# Patient Record
Sex: FEMALE | Race: WHITE | NOT HISPANIC OR LATINO | Employment: UNEMPLOYED | URBAN - METROPOLITAN AREA
[De-identification: names, ages, dates, MRNs, and addresses within clinical notes are randomized per-mention and may not be internally consistent; named-entity substitution may affect disease eponyms.]

---

## 2017-05-30 ASSESSMENT — ENCOUNTER SYMPTOMS
SMELL DISTURBANCE: 0
NERVOUS/ANXIOUS: 1
PANIC: 1
NIGHT SWEATS: 0
TACHYCARDIA: 1
JOINT SWELLING: 0
CLAUDICATION: 0
POOR WOUND HEALING: 0
LIGHT-HEADEDNESS: 1
FEVER: 0
SKIN CHANGES: 0
MYALGIAS: 1
SINUS CONGESTION: 0
BLOOD IN STOOL: 0
BRUISES/BLEEDS EASILY: 0
DEPRESSION: 0
HYPERTENSION: 0
BOWEL INCONTINENCE: 0
VOMITING: 0
LOSS OF CONSCIOUSNESS: 0
TINGLING: 0
DIFFICULTY URINATING: 0
TROUBLE SWALLOWING: 1
LEG SWELLING: 0
DECREASED LIBIDO: 0
INSOMNIA: 1
JAUNDICE: 0
SWOLLEN GLANDS: 0
DISTURBANCES IN COORDINATION: 0
BLOATING: 1
TREMORS: 0
POLYDIPSIA: 0
FATIGUE: 1
NAUSEA: 1
CHILLS: 0
NUMBNESS: 1
ALTERED TEMPERATURE REGULATION: 1
WEIGHT GAIN: 0
DECREASED CONCENTRATION: 1
INCREASED ENERGY: 1
BACK PAIN: 1
CONSTIPATION: 1
SPEECH CHANGE: 0
EXERCISE INTOLERANCE: 0
WEAKNESS: 0
MEMORY LOSS: 1
HEARTBURN: 0
HEMATURIA: 0
PARALYSIS: 0
MUSCLE WEAKNESS: 0
TASTE DISTURBANCE: 0
HEADACHES: 0
LEG PAIN: 0
SEIZURES: 0
DECREASED APPETITE: 0
RECTAL BLEEDING: 1
FLANK PAIN: 0
STIFFNESS: 1
NECK PAIN: 1
POLYPHAGIA: 0
ABDOMINAL PAIN: 0
PALPITATIONS: 0
HOARSE VOICE: 0
WEIGHT LOSS: 0
SLEEP DISTURBANCES DUE TO BREATHING: 0
NAIL CHANGES: 0
DIARRHEA: 0
RECTAL PAIN: 0
SORE THROAT: 0
DYSURIA: 0
SINUS PAIN: 0
HOT FLASHES: 0
HYPOTENSION: 1
ARTHRALGIAS: 1
DIZZINESS: 0
NECK MASS: 0
ORTHOPNEA: 0
HALLUCINATIONS: 0
MUSCLE CRAMPS: 1
SYNCOPE: 0

## 2017-05-31 ENCOUNTER — CARE COORDINATION (OUTPATIENT)
Dept: ONCOLOGY | Facility: CLINIC | Age: 47
End: 2017-05-31

## 2017-05-31 ENCOUNTER — ONCOLOGY VISIT (OUTPATIENT)
Dept: ONCOLOGY | Facility: CLINIC | Age: 47
End: 2017-05-31
Attending: INTERNAL MEDICINE
Payer: COMMERCIAL

## 2017-05-31 VITALS
DIASTOLIC BLOOD PRESSURE: 67 MMHG | RESPIRATION RATE: 16 BRPM | HEART RATE: 87 BPM | WEIGHT: 113.8 LBS | HEIGHT: 65 IN | BODY MASS INDEX: 18.96 KG/M2 | OXYGEN SATURATION: 96 % | TEMPERATURE: 98.5 F | SYSTOLIC BLOOD PRESSURE: 115 MMHG

## 2017-05-31 DIAGNOSIS — R04.0 EPISTAXIS: ICD-10-CM

## 2017-05-31 DIAGNOSIS — R53.82 CHRONIC FATIGUE: Primary | ICD-10-CM

## 2017-05-31 DIAGNOSIS — M79.7 FIBROMYALGIA: ICD-10-CM

## 2017-05-31 DIAGNOSIS — R53.82 CHRONIC FATIGUE: ICD-10-CM

## 2017-05-31 LAB
ALBUMIN SERPL-MCNC: 4.6 G/DL (ref 3.4–5)
ALP SERPL-CCNC: 77 U/L (ref 40–150)
ALT SERPL W P-5'-P-CCNC: 22 U/L (ref 0–50)
ANION GAP SERPL CALCULATED.3IONS-SCNC: 9 MMOL/L (ref 3–14)
APTT PPP: 26 SEC (ref 22–37)
AST SERPL W P-5'-P-CCNC: 17 U/L (ref 0–45)
BASOPHILS # BLD AUTO: 0.1 10E9/L (ref 0–0.2)
BASOPHILS NFR BLD AUTO: 0.9 %
BILIRUB SERPL-MCNC: 0.4 MG/DL (ref 0.2–1.3)
BUN SERPL-MCNC: 14 MG/DL (ref 7–30)
CALCIUM SERPL-MCNC: 9.2 MG/DL (ref 8.5–10.1)
CHLORIDE SERPL-SCNC: 106 MMOL/L (ref 94–109)
CO2 SERPL-SCNC: 25 MMOL/L (ref 20–32)
CREAT SERPL-MCNC: 0.59 MG/DL (ref 0.52–1.04)
DIFFERENTIAL METHOD BLD: ABNORMAL
EOSINOPHIL # BLD AUTO: 0.1 10E9/L (ref 0–0.7)
EOSINOPHIL NFR BLD AUTO: 0.7 %
ERYTHROCYTE [DISTWIDTH] IN BLOOD BY AUTOMATED COUNT: 12.1 % (ref 10–15)
GFR SERPL CREATININE-BSD FRML MDRD: NORMAL ML/MIN/1.7M2
GLUCOSE SERPL-MCNC: 83 MG/DL (ref 70–99)
HCT VFR BLD AUTO: 42 % (ref 35–47)
HGB BLD-MCNC: 13.2 G/DL (ref 11.7–15.7)
IMM GRANULOCYTES # BLD: 0 10E9/L (ref 0–0.4)
IMM GRANULOCYTES NFR BLD: 0.2 %
INR PPP: 1.03 (ref 0.86–1.14)
LMWH PPP CHRO-ACNC: NORMAL IU/ML
LYMPHOCYTES # BLD AUTO: 2.4 10E9/L (ref 0.8–5.3)
LYMPHOCYTES NFR BLD AUTO: 27.1 %
MAGNESIUM SERPL-MCNC: 2.4 MG/DL (ref 1.6–2.3)
MCH RBC QN AUTO: 33.1 PG (ref 26.5–33)
MCHC RBC AUTO-ENTMCNC: 31.4 G/DL (ref 31.5–36.5)
MCV RBC AUTO: 105 FL (ref 78–100)
MONOCYTES # BLD AUTO: 0.5 10E9/L (ref 0–1.3)
MONOCYTES NFR BLD AUTO: 5.9 %
NEUTROPHILS # BLD AUTO: 5.7 10E9/L (ref 1.6–8.3)
NEUTROPHILS NFR BLD AUTO: 65.2 %
NRBC # BLD AUTO: 0 10*3/UL
NRBC BLD AUTO-RTO: 0 /100
PLATELET # BLD AUTO: 365 10E9/L (ref 150–450)
POTASSIUM SERPL-SCNC: 3.7 MMOL/L (ref 3.4–5.3)
PROT SERPL-MCNC: 7.9 G/DL (ref 6.8–8.8)
RBC # BLD AUTO: 3.99 10E12/L (ref 3.8–5.2)
SODIUM SERPL-SCNC: 140 MMOL/L (ref 133–144)
WBC # BLD AUTO: 8.7 10E9/L (ref 4–11)

## 2017-05-31 PROCEDURE — 82542 COL CHROMOTOGRAPHY QUAL/QUAN: CPT | Mod: 91 | Performed by: INTERNAL MEDICINE

## 2017-05-31 PROCEDURE — 85610 PROTHROMBIN TIME: CPT | Performed by: INTERNAL MEDICINE

## 2017-05-31 PROCEDURE — 82542 COL CHROMOTOGRAPHY QUAL/QUAN: CPT | Performed by: INTERNAL MEDICINE

## 2017-05-31 PROCEDURE — 85520 HEPARIN ASSAY: CPT | Performed by: INTERNAL MEDICINE

## 2017-05-31 PROCEDURE — 83088 ASSAY OF HISTAMINE: CPT | Performed by: INTERNAL MEDICINE

## 2017-05-31 PROCEDURE — 88184 FLOWCYTOMETRY/ TC 1 MARKER: CPT | Performed by: INTERNAL MEDICINE

## 2017-05-31 PROCEDURE — 85730 THROMBOPLASTIN TIME PARTIAL: CPT | Performed by: INTERNAL MEDICINE

## 2017-05-31 PROCEDURE — 86316 IMMUNOASSAY TUMOR OTHER: CPT | Performed by: INTERNAL MEDICINE

## 2017-05-31 PROCEDURE — 83520 IMMUNOASSAY QUANT NOS NONAB: CPT | Performed by: INTERNAL MEDICINE

## 2017-05-31 PROCEDURE — 99354 ZZC PROLONGED SERV,OFFICE,1ST HR: CPT | Mod: ZP | Performed by: INTERNAL MEDICINE

## 2017-05-31 PROCEDURE — 84150 ASSAY OF PROSTAGLANDIN: CPT | Performed by: INTERNAL MEDICINE

## 2017-05-31 PROCEDURE — 88185 FLOWCYTOMETRY/TC ADD-ON: CPT | Performed by: INTERNAL MEDICINE

## 2017-05-31 PROCEDURE — 99212 OFFICE O/P EST SF 10 MIN: CPT | Mod: ZF

## 2017-05-31 PROCEDURE — 99205 OFFICE O/P NEW HI 60 MIN: CPT | Mod: ZP | Performed by: INTERNAL MEDICINE

## 2017-05-31 PROCEDURE — 83516 IMMUNOASSAY NONANTIBODY: CPT | Performed by: INTERNAL MEDICINE

## 2017-05-31 PROCEDURE — 80053 COMPREHEN METABOLIC PANEL: CPT | Performed by: INTERNAL MEDICINE

## 2017-05-31 PROCEDURE — 83735 ASSAY OF MAGNESIUM: CPT | Performed by: INTERNAL MEDICINE

## 2017-05-31 PROCEDURE — 85025 COMPLETE CBC W/AUTO DIFF WBC: CPT | Performed by: INTERNAL MEDICINE

## 2017-05-31 ASSESSMENT — PAIN SCALES - GENERAL: PAINLEVEL: NO PAIN (0)

## 2017-05-31 NOTE — MR AVS SNAPSHOT
After Visit Summary   5/31/2017    Kenton Mathew    MRN: [id number]           Patient Information     Date Of Birth          1970        Visit Information        Provider Department      5/31/2017 2:30 PM Keegan Willson MD [facility]        Today's Diagnoses     Chronic fatigue    -  1    Fibromyalgia        Epistaxis           Follow-ups after your visit        Follow-up notes from your care team     Return in about 3 months (around 8/31/2017).      Your next 10 appointments already scheduled     Jun 02, 2017  8:15 AM CDT   Lab with  LAB   [facility])    [address] [place] [address]  [address] [facility] [id number] [phone]            Oct 13, 2017  8:40 AM CDT   (Arrive by 8:25 AM)   Return Visit with Rafaela Obrien PA-C   [facility] ([facility])    [address] [place] [address]  [address] [facility] [id number] [phone]            Jan 09, 2018  2:30 PM CST   (Arrive by 2:15 PM)   New Patient Visit with Keegan Willson MD   [facility] ([facility])    [address] [place] [id number] [phone]              Future tests that were ordered for you today     Open Future Orders        Priority Expected Expires Ordered    2,3 Dinor 11 Beta Prostaglandin F2 Alpha UR 24 hour Routine 6/2/2017 5/31/2018 5/31/2017    N-Methylhistamine Urine 24 hour Routine 6/2/2017 5/31/2018 5/31/2017    Prostaglandins D2 Urine: 24 hour Routine 6/2/2017 5/31/2018 5/31/2017    Leukotriene E4 Urine: 24 hour Routine 6/2/2017 5/31/2018 5/31/2017            Who to contact     If you have questions or need follow up information about today's clinic visit or your schedule please contact [facility] directly at [phone].  Normal or non-critical lab and imaging results will be communicated to you by MyChart, letter or  "phone within 4 business days after the clinic has received the results. If you do not hear from us within 7 days, please contact the clinic through Walk-in Appointment Scheduler or phone. If you have a critical or abnormal lab result, we will notify you by phone as soon as possible.  Submit refill requests through Walk-in Appointment Scheduler or call your pharmacy and they will forward the refill request to us. Please allow 3 business days for your refill to be completed.          Additional Information About Your Visit        [employer] Information     Walk-in Appointment Scheduler gives you secure access to your electronic health record. If you see a primary care provider, you can also send messages to your care team and make appointments. If you have questions, please call your primary care clinic.  If you do not have a primary care provider, please call [phone] and they will assist you.        Care EveryWhere ID     This is your Care EveryWhere ID. This could be used by other organizations to access your [place] medical records  [id number]        Your Vitals Were     Pulse Temperature Respirations Height Last Period Pulse Oximetry    87 98.5  F (36.9  C) (Oral) 16 1.651 m (5' 5\") 05/20/2017 96%    BMI (Body Mass Index)                   18.94 kg/m2            Blood Pressure from Last 3 Encounters:   05/31/17 115/67   09/15/15 106/64    Weight from Last 3 Encounters:   05/31/17 51.6 kg (113 lb 12.8 oz)   09/15/15 50.3 kg (111 lb)              We Performed the Following     Anti IgE Antibody     Anti IgE Receptor Antibody     CBC with platelets differential     Chromogranin A     Comprehensive metabolic panel     Histamine     INR     Leukotriene E4 Urine: Random     Magnesium     N-Methylhistamine Urine Random     Partial thromboplastin time     Prostaglandin PGD2     Prostaglandins D2 Urine: Random     Tryptase          Today's Medication Changes          These changes are accurate as of: 5/31/17 11:59 PM.  If you have any questions, ask your nurse or doctor.             "   Stop taking these medicines if you haven't already. Please contact your care team if you have questions.     ASPIRIN PO   Stopped by:  Keegan Willson MD                    Primary Care Provider    None Specified       No primary provider on file.        Thank you!     Thank you for choosing [facility]  for your care. Our goal is always to provide you with excellent care. Hearing back from our patients is one way we can continue to improve our services. Please take a few minutes to complete the written survey that you may receive in the mail after your visit with us. Thank you!             Your Updated Medication List - Protect others around you: Learn how to safely use, store and throw away your medicines at www.disposemymeds.org.          This list is accurate as of: 5/31/17 11:59 PM.  Always use your most recent med list.                   Brand Name Dispense Instructions for use    MULTIVITAMIN/IRON PO      Take 1 tablet by mouth daily       TRAZODONE HCL PO      Take 100 mg by mouth daily       VITAMIN D (CHOLECALCIFEROL) PO      Take 4,000 Units by mouth daily

## 2017-05-31 NOTE — LETTER
2017       RE: Kenton Mathew  [address]  [facility] [id number]     Dear Colleague,    Thank you for referring your patient, Kenton Mathew, to the [facility]. Please see a copy of my visit note below.    Patient: Kenton Mathew (MRN [id number])   Encounter Date: May 31, 2017  Referring: Jessica Lyons M.D. ([facility],  [address], [place] [address], [phone], fax [phone])  Attending: Keegan Willson M.D.     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  At the beginning of the encounter, I reviewed all available chart data, consisting solely of about 50 pages of an outside genomic analysis whose validity immediately seemed questionable (details below), plus a modicum of lab test reports from [facility] accessed via her phone, plus an extensive assortment of labs accessed via [facility], but there really were no prior useful clinical notes for me to review, so I just proceeded to take a full history from scratch.  Note the patient was recommended by Dr. Bernarda Zenker of [facility] in [place], [place] to come here for evaluation, but the patient asked that I send a copy of my note only to her primary provider, Dr. Lyons, and to the patient herself.  The history here is a bit complex, and it's probably best to just present it all chronologically, blending HPI and PMH as follows.  The patient's history of chronic multisystem polymorbidity of generally inflammatory theme begins with onset of chronic constipation in college or possibly even earlier; somewhat oddly, she says she can't remember anything that happened to her before  "that point.  She says that otherwise she was perfectly healthy while growing up and got  at 24, but then the marriage started souring and she started suffering a lot of stress from this in her late 20s.  At 31 she suffered her first significant health problem, a vertebral artery dissection causing TIAs, visual blurriness, anesthesia in the right hand, and mild dysphasia, though fortunately she gained complete neurologic recovery.  She finally  at 32, remarried at 33, and then at 34 had her first pregnancy, which went quite smoothly, ending with a scheduled .  Her second pregnancy came at 36, also went smoothly, and also ended with a scheduled .  An episode of abdominal pain at 37, though, led to an appendectomy, and then at 38 she suddenly started suffering chronic fatigue (attributed by her physicians to the demands of ) and a new left proximal dysphagia and left throat \"lump\" which were evaluated by an ENT physician and diagnosed as globus.  A swallow study was normal.  At 40, despite no apparent troubles tolerating any foods, an EGD done to further investigate her dysphagia found \"total atrophy\" (per the patient's recollection; again, I have no records) and celiac disease was diagnosed.  She switched to a gluten-free diet and felt better for two weeks, but then her chronic fatigue relapsed.  She says she tried several other diets (elimination, paleo, low-histamine), but none ever provided any clear benefit.  She then saw a celiac disease specialist at [facility] who confirmed this diagnosis but felt it did not explain her symptoms, so she was referred to Neurology at [facility].  She says an EMG was negative, so she was referred to [facility]'s chronic fatigue syndrome/fibromyalgia center, where at 41 she was diagnosed with fibromyalgia.  She says this is also when there emerged the mild macrocytosis which has persisted, stable and unexplained, ever since.  Hematology evaluation at [facility] " "when she was 44 failed to identify a cause.  No marrow examination was performed.  She says three female cousins who have problems similar to hers were all found to have some sort of an MTHFR mutation, and she was recommended to see Dr. Zenker for this.  She says Dr. Zenker prescribed multiple supplements to treat the patient's MTHFR mutation, but none of them seemed to help her feel any better.  She says Dr. 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  The patient says that while she was waiting for her appointment here, Dr. Zenker asked her to empirically try a full (H1 + H2) histamine receptor blockade.  She cannot remember specifically which drugs she tried, but she is pretty sure it was just one H1 blocker and one H2 blocker, and they did not discernibly help, so they were stopped.  She also knows her ferritin and vitamin D have recently been near the lower ends of those tests' normal ranges.  She also reports a number of other (typically episodic, waxing/waning) problems which have emerged in the last 3-5 years including headaches, right eye pain, right pupil enlargement, plugging of her ears, bags under her eyes, jaw pain, mental fog, diffusely migratory joint stiffness and pain, muscle pain and stiffness, palpitations, insomnia, frequent waking, swelling of the nose, coryza, anxiety, foot cramping, arms falling asleep, and various rashes about the abdomen and extremities, especially \"red dots\" and non-pruritic welts on her abdomen, sometimes also on the inner arms and thighs, plus other, rough itchy rashes on her arms in the last year.    On a full review of systems, although she denies any issues with flushing, " "sweats, visual anomalies, unexplained fluctuations in weight or appetite, easy bleeding or bruising, sinonasal congestion, post-nasal drip, irritation of the nose/mouth/throat, chest pain/discomfort, gastroesophageal reflux, vomiting, diarrhea, abdominal pain, urinary urgency, syncope, onychodystrophy, or joint hypermobility, she endorses a wide range of other, chronic/recurrent, episodic/intermittent and/or waxing/waning issues including subjective (rarely objective) fevers, feeling cold much of the time, fatigue (often to the point of exhaustion), malaise, headaches, diffusely migratory aching/pain, diffusely migratory pruritus, irritation of the eyes, plugging of the ears, occasional tinnitus, episodes of spontaneous epistaxis since about age 44, coryza, proximal dysphagia, dyspnea (on occasion she \"just can't catch a deep breath\") but no wheezing, palpitations, nausea, constipation, (usually post-prandial) abdominal bloating, diffusely migratory weakness, edema diffusely migratory about the eyes, nose, throat, and ears, diffusely migratory tingling/numbness (typically about the upper extremities), diffusely migratory occasional bilateral cervical adenitis (but no adenopathy), her first episode ever of presyncope that she can remember occurring just yesterday in the shower, cognitive dysfunction (particularly memory, concentration, and word-finding), hair loss, receding gums, diffusely migratory rashes (typically patchy macular erythema), hives, insomnia, frequent waking, depression (a number of medications were tried, but none ever appeared to help), and vigorous insect bite reactions.  She notes she is asymptomatic if she eats gluten-containing foods.  She thinks she may have an anal fissure.  Complete ROS o/w neg.    Family history is notable for a father with polymyalgia rheumatica, hyperlipidemia, hypertension, and glaucoma, a mother with celiac disease, a benign thyroid nodule which led to a thyroidectomy " "and thyroid replacement therapy, plus a chronic problem with an itchy rash on her back (just as the patient herself now endures), a brother with asthma, a sister with asthma and \"muscle deterioration,\" another brother with celiac disease and asthma, and two healthy children. The patient denies any history of smoking, significant alcohol use (note she does tolerate alcohol), or illegal substance use.    She lists her current medications as vitamin D 4,000 units qd, a daily multivitamin, and trazodone 100 mg qd.  She lists her current allergies as GI upset to gluten, and yet, again, she tells me she can eat gluten-containing foods without any difficulty..    Exam finds a thin (but not cachectic) woman in no apparent distress, pleasant, cooperative, fully alert and oriented, easily independently ambulatory, presenting by herself.  She has many small moles, mostly about her bilateral upper extremities.  Vitals per chart, all unremarkable.  Key findings are her thin but comfortable general appearance, HEENT benign, no plethora/pallor/diaphoresis/jaundice/bleeding/bruising, perhaps a very subtle residual patchy macular lightly erythematous rash about the bilateral shins (plus a healing rash biopsy site on the right shin), neck reported as just a little bit stiff (chronically) in all axes and directions on range of motion testing, no JVD or thyromegaly or carotid bruits, no palpable adenopathy or tenderness at any of the usual node-bearing sites, clear lungs, regular heart with no adventitious sounds, abdomen benign, no peripheral edema, neuro grossly intact.  On a light scratch test on the upper back, moderately bright dermatographism (erythroderma only, no hives) quickly emerged and was fully sustained when last checked 10 minutes later.     Review of available lab data was notable for normal CMP, TSH, LDH, pyridoxal 5-phosphate, anti-thyroid peroxidase antibodies, DARRYL panel, vitamin E, anti-acetylcholine-receptor " "antibodies, anti-striated muscle antibodies, angiotensin converting enzyme, creatine kinase, cortisol, ESR, INR, CCP, IgA, RF, and CRP.  Folate was high.  There was a slight increase in carotene.  Celiac antibody tests at [facility] were negative.  Serum copper and zinc were occasionally slightly low.  The genomic analysis is riddled with \"red alerts,\" but when I started by looking at the SANDY-related SNPs, I saw that she was marked as having homozygosity for the risk allele for two of these SNPs, and yet when I researched these SNPs further on-line, it was immediately apparent that her genotype for each of these SNPs was actually normal, not high-risk, so both of the \"red alerts\" for possible SANDY deficiency on her report appeared incorrect to me, and I felt at that point that examination of the rest of the report would not be sufficiently productive to spend more time on the matter.    Lab reports on [facility] show a number of curiosities.  With regard to CBCs, in 3/05 and 4/05 she had a borderline anemia and mild macrocytosis (103-105) (but a normal RDW), and then suddenly in 9/05 her CBCDs are completely normal (MCVs 95-96), followed in 5/09 with a normal CBCD with an MCV of 87, then in '10-'11 mild anemia with low-normal MCVs and minimal thrombocytosis, and then since around '15 she's had a mostly normal H/H but an MCV of 102 (and, again, a very normal RDW).  An IgA tissue transglutaminase antibody in '11 was quite high, but multiple celiac serologies since '15 have all been completely negative.  GAEL negative.  Lipids unremarkable.  CMP and magnesium normal.  Whole blood histamine in 12/15 was normal.  Homocysteine normal.  Hematopathology review of the blood smear in '15 confirmed the \"slight macrocytosis\" but could not identify a cause.  The right foreleg rash biopsy in 5/17 was read as \"subacute spongiotic dermatitis with mild superficial perivascular lymphocytic inflammation admixed with a few extravasated red " "blood cells...the findings are relatively mild and not clearly diagnostic, but would be most supportive of some form of eczematous dermatitis or possibly the eczematous variant of pigmented purpuric dermatitis.\"    A/P: Yarelis to Dr. Zenker, because I think she's likely right in her suspicion that a mast cell activation disorder (MCAD) -- and far more likely the far more prevalent (if only recently recognized) type termed mast cell activation syndrome (MCAS) than the rare (but long recognized) type termed mastocytosis -- is at the root of most or all of the patient's chronic multisystem polymorbidity of generally inflammatory theme.  Beyond all the other diseases already ruled out by the great extent of testing she's undergone to date, I don't know of any other human disease that comes anywhere near as close as MCAS does to accounting, directly or indirectly, for the full range and chronicity of all the symptoms and findings here. Literally everything here is consistent with what JYOTIS can and often does do, from the arterial dissection to her specific phrasing of how she occasionally just can't catch a deep breath (*precisely* the phrasing I most commonly hear from MCAS patients reporting dyspnea) plus the specific phrasing of the skin biopsy report, which, though non-specific, precisely matches what I see most often in skin biopsies from MCAS patients.  (Of note, too, I'm not saying that any of her prior diagnoses are wrong (though I'm not entirely confident about the celiac disease diagnosis). It's just that each of them accounts for only one subset or another of all that's gone on in her, while MCAS can account for all of everything that's been going on in her.) All of that said, she needs objective laboratory evidence of improperly behaving mast cells, toward which end I ordered the range of testing I typically check in assessing for MCAS, namely, a CBCD, CMP, magnesium, PT/PTT, chilled serum tryptase and " "chromogranin A, chilled plasma prostaglandin D2 and histamine and heparin, chilled random urinary prostaglandin D2 and N-methylhistamine and leukotriene E4 and 2,3-dinor 11-beta-prostaglandin-F2-alpha, and chilled 24-hour urinary prostaglandin D2 and N-methylhistamine and leukotriene E4 and 2,3-dinor 11-beta-prostaglandin-F2-alpha. I also ordered an anti-IgE IgG and an anti-IgE receptor antibody, which won't be useful diagnostically but may influence certain therapeutic decisions down the road if MCAS is proven. We confirmed she has abstained from confounding use of proton pump inhibitors (PPIs) and non-steroidal anti-inflammatory drugs (NSAIDs) for the prior 5+ days. We provided her careful written and verbal instructions regarding the 24-hour urine collection including the importance of continuous chilling of the specimen throughout collection and transport.  She understands the various reasons why a single round of  testing might yield all negative results, and she understands that if this happens, it of course doesn't even begin to invalidate/refute/negate even a single element of her history (which strongly argues for a mast cell disorder). Her history is what it is, so if we get a negative round of testing, I'll simply recommend repeat testing (which she'll of course need to pursue locally), albeit with specimen collection at that point preferably deferred to a particularly symptomatic day. I also asked her to work with our clinic nurse coordinator, Nighat Mckinney RN, to try to arrange for her old GI tract biopsies and resection specimens to be reassessed (either locally or here) with  staining, as mast cells can't be seen with routine H&E staining and I've often seen \"normal\" or \"mildly chronically inflamed\" GI tract biopsies in MCAS patients \"light up\" on  staining revealing mast cell disease. If 2-3 rounds of blood and urine testing yields all negative results, and if restaining of " "any available old biopsies either can't be done for some reason or yields negative results, the \"backup plan\" will be to pursue a fresh set of bidirectional endoscopies to obtain biopsies throughout the luminal GI tract for pathologic evaluation specifically (using  staining at a minimum) for increased (>20/hpf) numbers of mast cells (as often seen mildly-moderately in MCAS, though not to anywhere near the degree (or patterns) seen in mastocytosis).     Although I cautioned her that she doesn't have a definitive diagnosis of MCAS yet, we did engage in extended discussion today about general aspects of MCAS including its essential nature of chronic multisystem polymorbidity of a generally inflammatory theme, the availability of many therapies shown helpful in various MCAD/MCAS patients, the good likelihood of (eventually) finding therapy that will help her achieve the goal of feeling significantly better than the pre-treatment baseline the majority of the time, and the present frustrating absence of any biomarkers that can help predict which of the many available, potentially helpful therapies is most likely to benefit the individual patient. She understands that if we are able to secure a diagnosis of MCAS, she will be dependent on pursuing -- in patient, persistent, and methodical fashion, trying as hard as possible to make just one change at a time -- trials of the various therapies (which, again, lacking predictive biomarkers, we generally pursue in order of escalating cost). She understands that once all test results are available about a month from now, I will write an addendum to this note (to again be distributed to all of her physicians listed below) providing my interpretation of the results and recommendations for next steps.     I told her that the only therapy I am willing to empirically recommend in a patient with suspected, but not yet proven, MCAS is a full (H1 + H2) histamine receptor blockade " "(e.g., cetirizine 10 mg bid or fexofenadine  mg bid + famotidine 20 mg bid or ranitidine  mg bid). She understands that some patients find that alternative H1 or H2 blockers serve them better, some patients find that tid dosing serves them better than bid dosing, and some patients find that slightly higher dosages (e.g., 20-30 mg rather than 10 mg of cetirizine, or 150 mg rather than 75 mg of ranitidine) serve them better than lower dosages. She will need to \"experiment,\" taking 2-4 weeks with each non-sedating H1 blocker (loratadine, cetirizine, fexofenadine, and levocetirizine, each at standard OTC doses but taken bid) to identify which serves her best, and then do the same with (two or more, each in turn) of the OTC H2 blockers (famotidine, ranitidine, cimetidine, and nizatadine) to identify which H2 blocker serves here best.  I also cautioned her that MCAD/MCAS patients have quite a predilection to adversely react not necessarily to the active ingredients in their medications but rather to the excipients (fillers, binders, dyes, preservatives, etc.) in their medications. As such, if she experiences an adverse reaction very shortly after beginning an ordinarily well tolerated medication (as is certainly the case with the H1 and H2 blockers), excipient reactivity must be suspected and she should promptly work with her pharmacist to review the medication's ingredient list, try to identify the likely offending ingredient, and try one or more alternative formulations of the medication which don't share any of the offending formulation's excipients. She appears to understand.     It will be difficult (given that my schedule is now 100% booked for the next year) for me to see her back once the test results are back in a month or so, let alone to manage her through the \"tactical maneuvers\" of trying various medications to gain better control over the disease.  She understands it will be important for her to " "take time to try the various available non-sedating H1 blockers and H2 blockers.  We will have her return in about three months to see the senior physician's assistant here, Briefili Obrien, who now is kindly assisting me with my follow-up patients. Ms. Obrien will work through the above-noted tactical maneuvers with the patient, and I'll see her roughly every 6-12 months for \"strategic reassessments.\"     As is usually the case with initial consultations for mast cell disease, this was a long encounter, 120 minutes in total, with 70 minutes spent in counseling. The patient indicated that all of her questions at this time had been answered to her satisfaction, and she expressed appreciation for the time I had given her.      Typed, reviewed, and electronically signed by: Keegan Willson M.D.     DT: 06/01/17 01:19 AM    cc: 1. Jessica Lyons M.D. ([facility], [address], [place] [address], [phone], fax [phone])  2. Please also mail a copy to the patient at her home address as listed in the system.    Again, thank you for allowing me to participate in the care of your patient.      Sincerely,    Keegan Willson MD      "

## 2017-05-31 NOTE — LETTER
2017    RE: Kenton Mathew  [address]  [facility] [id number]     Patient: Kenton Mathew (MRN [id number])   Encounter Date: May 31, 2017  Referring: Jessica Lyons M.D. ([facility],  [address], [place] [address], [phone], fax [phone])  Attending: Keegan Willson M.D.     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  At the beginning of the encounter, I reviewed all available chart data, consisting solely of about 50 pages of an outside genomic analysis whose validity immediately seemed questionable (details below), plus a modicum of lab test reports from [facility] accessed via her phone, plus an extensive assortment of labs accessed via [facility], but there really were no prior useful clinical notes for me to review, so I just proceeded to take a full history from scratch.  Note the patient was recommended by Dr. Bernarda Zenker of [facility] in [place], [place] to come here for evaluation, but the patient asked that I send a copy of my note only to her primary provider, Dr. Lyons, and to the patient herself.  The history here is a bit complex, and it's probably best to just present it all chronologically, blending HPI and PMH as follows.  The patient's history of chronic multisystem polymorbidity of generally inflammatory theme begins with onset of chronic constipation in college or possibly even earlier; somewhat oddly, she says she can't remember anything that happened to her before that point.  She says that otherwise she was perfectly healthy while growing up and got  at 24, but then the marriage started souring and she started  "suffering a lot of stress from this in her late 20s.  At 31 she suffered her first significant health problem, a vertebral artery dissection causing TIAs, visual blurriness, anesthesia in the right hand, and mild dysphasia, though fortunately she gained complete neurologic recovery.  She finally  at 32, remarried at 33, and then at 34 had her first pregnancy, which went quite smoothly, ending with a scheduled .  Her second pregnancy came at 36, also went smoothly, and also ended with a scheduled .  An episode of abdominal pain at 37, though, led to an appendectomy, and then at 38 she suddenly started suffering chronic fatigue (attributed by her physicians to the demands of ) and a new left proximal dysphagia and left throat \"lump\" which were evaluated by an ENT physician and diagnosed as globus.  A swallow study was normal.  At 40, despite no apparent troubles tolerating any foods, an EGD done to further investigate her dysphagia found \"total atrophy\" (per the patient's recollection; again, I have no records) and celiac disease was diagnosed.  She switched to a gluten-free diet and felt better for two weeks, but then her chronic fatigue relapsed.  She says she tried several other diets (elimination, paleo, low-histamine), but none ever provided any clear benefit.  She then saw a celiac disease specialist at [facility] who confirmed this diagnosis but felt it did not explain her symptoms, so she was referred to Neurology at [facility].  She says an EMG was negative, so she was referred to [facility]'s chronic fatigue syndrome/fibromyalgia center, where at 41 she was diagnosed with fibromyalgia.  She says this is also when there emerged the mild macrocytosis which has persisted, stable and unexplained, ever since.  Hematology evaluation at [facility] when she was 44 failed to identify a cause.  No marrow examination was performed.  She says three female cousins who have problems similar to hers were all " "found to have some sort of an MTHFR mutation, and she was recommended to see Dr. Zenker for this.  She says Dr. Zenker prescribed multiple supplements to treat the patient's MTHFR mutation, but none of them seemed to help her feel any better.  She says Dr. 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  The patient says that while she was waiting for her appointment here, Dr. Zenker asked her to empirically try a full (H1 + H2) histamine receptor blockade.  She cannot remember specifically which drugs she tried, but she is pretty sure it was just one H1 blocker and one H2 blocker, and they did not discernibly help, so they were stopped.  She also knows her ferritin and vitamin D have recently been near the lower ends of those tests' normal ranges.  She also reports a number of other (typically episodic, waxing/waning) problems which have emerged in the last 3-5 years including headaches, right eye pain, right pupil enlargement, plugging of her ears, bags under her eyes, jaw pain, mental fog, diffusely migratory joint stiffness and pain, muscle pain and stiffness, palpitations, insomnia, frequent waking, swelling of the nose, coryza, anxiety, foot cramping, arms falling asleep, and various rashes about the abdomen and extremities, especially \"red dots\" and non-pruritic welts on her abdomen, sometimes also on the inner arms and thighs, plus other, rough itchy rashes on her arms in the last year.    On a full review of systems, although she denies any issues with flushing, sweats, visual anomalies, unexplained fluctuations in weight or appetite, easy bleeding or bruising, sinonasal congestion, post-nasal drip, irritation of the " "nose/mouth/throat, chest pain/discomfort, gastroesophageal reflux, vomiting, diarrhea, abdominal pain, urinary urgency, syncope, onychodystrophy, or joint hypermobility, she endorses a wide range of other, chronic/recurrent, episodic/intermittent and/or waxing/waning issues including subjective (rarely objective) fevers, feeling cold much of the time, fatigue (often to the point of exhaustion), malaise, headaches, diffusely migratory aching/pain, diffusely migratory pruritus, irritation of the eyes, plugging of the ears, occasional tinnitus, episodes of spontaneous epistaxis since about age 44, coryza, proximal dysphagia, dyspnea (on occasion she \"just can't catch a deep breath\") but no wheezing, palpitations, nausea, constipation, (usually post-prandial) abdominal bloating, diffusely migratory weakness, edema diffusely migratory about the eyes, nose, throat, and ears, diffusely migratory tingling/numbness (typically about the upper extremities), diffusely migratory occasional bilateral cervical adenitis (but no adenopathy), her first episode ever of presyncope that she can remember occurring just yesterday in the shower, cognitive dysfunction (particularly memory, concentration, and word-finding), hair loss, receding gums, diffusely migratory rashes (typically patchy macular erythema), hives, insomnia, frequent waking, depression (a number of medications were tried, but none ever appeared to help), and vigorous insect bite reactions.  She notes she is asymptomatic if she eats gluten-containing foods.  She thinks she may have an anal fissure.  Complete ROS o/w neg.    Family history is notable for a father with polymyalgia rheumatica, hyperlipidemia, hypertension, and glaucoma, a mother with celiac disease, a benign thyroid nodule which led to a thyroidectomy and thyroid replacement therapy, plus a chronic problem with an itchy rash on her back (just as the patient herself now endures), a brother with asthma, a " "sister with asthma and \"muscle deterioration,\" another brother with celiac disease and asthma, and two healthy children. The patient denies any history of smoking, significant alcohol use (note she does tolerate alcohol), or illegal substance use.    She lists her current medications as vitamin D 4,000 units qd, a daily multivitamin, and trazodone 100 mg qd.  She lists her current allergies as GI upset to gluten, and yet, again, she tells me she can eat gluten-containing foods without any difficulty..    Exam finds a thin (but not cachectic) woman in no apparent distress, pleasant, cooperative, fully alert and oriented, easily independently ambulatory, presenting by herself.  She has many small moles, mostly about her bilateral upper extremities.  Vitals per chart, all unremarkable.  Key findings are her thin but comfortable general appearance, HEENT benign, no plethora/pallor/diaphoresis/jaundice/bleeding/bruising, perhaps a very subtle residual patchy macular lightly erythematous rash about the bilateral shins (plus a healing rash biopsy site on the right shin), neck reported as just a little bit stiff (chronically) in all axes and directions on range of motion testing, no JVD or thyromegaly or carotid bruits, no palpable adenopathy or tenderness at any of the usual node-bearing sites, clear lungs, regular heart with no adventitious sounds, abdomen benign, no peripheral edema, neuro grossly intact.  On a light scratch test on the upper back, moderately bright dermatographism (erythroderma only, no hives) quickly emerged and was fully sustained when last checked 10 minutes later.     Review of available lab data was notable for normal CMP, TSH, LDH, pyridoxal 5-phosphate, anti-thyroid peroxidase antibodies, DARRYL panel, vitamin E, anti-acetylcholine-receptor antibodies, anti-striated muscle antibodies, angiotensin converting enzyme, creatine kinase, cortisol, ESR, INR, CCP, IgA, RF, and CRP.  Folate was high.  There " "was a slight increase in carotene.  Celiac antibody tests at [facility] were negative.  Serum copper and zinc were occasionally slightly low.  The genomic analysis is riddled with \"red alerts,\" but when I started by looking at the SANDY-related SNPs, I saw that she was marked as having homozygosity for the risk allele for two of these SNPs, and yet when I researched these SNPs further on-line, it was immediately apparent that her genotype for each of these SNPs was actually normal, not high-risk, so both of the \"red alerts\" for possible SANDY deficiency on her report appeared incorrect to me, and I felt at that point that examination of the rest of the report would not be sufficiently productive to spend more time on the matter.    Lab reports on [facility] show a number of curiosities.  With regard to CBCs, in 3/05 and 4/05 she had a borderline anemia and mild macrocytosis (103-105) (but a normal RDW), and then suddenly in 9/05 her CBCDs are completely normal (MCVs 95-96), followed in 5/09 with a normal CBCD with an MCV of 87, then in '10-'11 mild anemia with low-normal MCVs and minimal thrombocytosis, and then since around '15 she's had a mostly normal H/H but an MCV of 102 (and, again, a very normal RDW).  An IgA tissue transglutaminase antibody in '11 was quite high, but multiple celiac serologies since '15 have all been completely negative.  GAEL negative.  Lipids unremarkable.  CMP and magnesium normal.  Whole blood histamine in 12/15 was normal.  Homocysteine normal.  Hematopathology review of the blood smear in '15 confirmed the \"slight macrocytosis\" but could not identify a cause.  The right foreleg rash biopsy in 5/17 was read as \"subacute spongiotic dermatitis with mild superficial perivascular lymphocytic inflammation admixed with a few extravasated red blood cells...the findings are relatively mild and not clearly diagnostic, but would be most supportive of some form of eczematous dermatitis or possibly the " "eczematous variant of pigmented purpuric dermatitis.\"    A/P: Seguns to Dr. Zenker, because I think she's likely right in her suspicion that a mast cell activation disorder (MCAD) -- and far more likely the far more prevalent (if only recently recognized) type termed mast cell activation syndrome (MCAS) than the rare (but long recognized) type termed mastocytosis -- is at the root of most or all of the patient's chronic multisystem polymorbidity of generally inflammatory theme.  Beyond all the other diseases already ruled out by the great extent of testing she's undergone to date, I don't know of any other human disease that comes anywhere near as close as MCAS does to accounting, directly or indirectly, for the full range and chronicity of all the symptoms and findings here. Literally everything here is consistent with what MCAS can and often does do, from the arterial dissection to her specific phrasing of how she occasionally just can't catch a deep breath (*precisely* the phrasing I most commonly hear from MCAS patients reporting dyspnea) plus the specific phrasing of the skin biopsy report, which, though non-specific, precisely matches what I see most often in skin biopsies from MCAS patients.  (Of note, too, I'm not saying that any of her prior diagnoses are wrong (though I'm not entirely confident about the celiac disease diagnosis). It's just that each of them accounts for only one subset or another of all that's gone on in her, while MCAS can account for all of everything that's been going on in her.) All of that said, she needs objective laboratory evidence of improperly behaving mast cells, toward which end I ordered the range of testing I typically check in assessing for MCAS, namely, a CBCD, CMP, magnesium, PT/PTT, chilled serum tryptase and chromogranin A, chilled plasma prostaglandin D2 and histamine and heparin, chilled random urinary prostaglandin D2 and N-methylhistamine and leukotriene E4 and " "2,3-dinor 11-beta-prostaglandin-F2-alpha, and chilled 24-hour urinary prostaglandin D2 and N-methylhistamine and leukotriene E4 and 2,3-dinor 11-beta-prostaglandin-F2-alpha. I also ordered an anti-IgE IgG and an anti-IgE receptor antibody, which won't be useful diagnostically but may influence certain therapeutic decisions down the road if MCAS is proven. We confirmed she has abstained from confounding use of proton pump inhibitors (PPIs) and non-steroidal anti-inflammatory drugs (NSAIDs) for the prior 5+ days. We provided her careful written and verbal instructions regarding the 24-hour urine collection including the importance of continuous chilling of the specimen throughout collection and transport.  She understands the various reasons why a single round of  testing might yield all negative results, and she understands that if this happens, it of course doesn't even begin to invalidate/refute/negate even a single element of her history (which strongly argues for a mast cell disorder). Her history is what it is, so if we get a negative round of testing, I'll simply recommend repeat testing (which she'll of course need to pursue locally), albeit with specimen collection at that point preferably deferred to a particularly symptomatic day. I also asked her to work with our clinic nurse coordinator, Nighat Mckinney RN, to try to arrange for her old GI tract biopsies and resection specimens to be reassessed (either locally or here) with  staining, as mast cells can't be seen with routine H&E staining and I've often seen \"normal\" or \"mildly chronically inflamed\" GI tract biopsies in MCAS patients \"light up\" on  staining revealing mast cell disease. If 2-3 rounds of blood and urine testing yields all negative results, and if restaining of any available old biopsies either can't be done for some reason or yields negative results, the \"backup plan\" will be to pursue a fresh set of bidirectional " endoscopies to obtain biopsies throughout the luminal GI tract for pathologic evaluation specifically (using  staining at a minimum) for increased (>20/hpf) numbers of mast cells (as often seen mildly-moderately in MCAS, though not to anywhere near the degree (or patterns) seen in mastocytosis).     Although I cautioned her that she doesn't have a definitive diagnosis of MCAS yet, we did engage in extended discussion today about general aspects of MCAS including its essential nature of chronic multisystem polymorbidity of a generally inflammatory theme, the availability of many therapies shown helpful in various MCAD/MCAS patients, the good likelihood of (eventually) finding therapy that will help her achieve the goal of feeling significantly better than the pre-treatment baseline the majority of the time, and the present frustrating absence of any biomarkers that can help predict which of the many available, potentially helpful therapies is most likely to benefit the individual patient. She understands that if we are able to secure a diagnosis of MCAS, she will be dependent on pursuing -- in patient, persistent, and methodical fashion, trying as hard as possible to make just one change at a time -- trials of the various therapies (which, again, lacking predictive biomarkers, we generally pursue in order of escalating cost). She understands that once all test results are available about a month from now, I will write an addendum to this note (to again be distributed to all of her physicians listed below) providing my interpretation of the results and recommendations for next steps.     I told her that the only therapy I am willing to empirically recommend in a patient with suspected, but not yet proven, MCAS is a full (H1 + H2) histamine receptor blockade (e.g., cetirizine 10 mg bid or fexofenadine  mg bid + famotidine 20 mg bid or ranitidine  mg bid). She understands that some patients find that  "alternative H1 or H2 blockers serve them better, some patients find that tid dosing serves them better than bid dosing, and some patients find that slightly higher dosages (e.g., 20-30 mg rather than 10 mg of cetirizine, or 150 mg rather than 75 mg of ranitidine) serve them better than lower dosages. She will need to \"experiment,\" taking 2-4 weeks with each non-sedating H1 blocker (loratadine, cetirizine, fexofenadine, and levocetirizine, each at standard OTC doses but taken bid) to identify which serves her best, and then do the same with (two or more, each in turn) of the OTC H2 blockers (famotidine, ranitidine, cimetidine, and nizatadine) to identify which H2 blocker serves here best.  I also cautioned her that MCAD/MCAS patients have quite a predilection to adversely react not necessarily to the active ingredients in their medications but rather to the excipients (fillers, binders, dyes, preservatives, etc.) in their medications. As such, if she experiences an adverse reaction very shortly after beginning an ordinarily well tolerated medication (as is certainly the case with the H1 and H2 blockers), excipient reactivity must be suspected and she should promptly work with her pharmacist to review the medication's ingredient list, try to identify the likely offending ingredient, and try one or more alternative formulations of the medication which don't share any of the offending formulation's excipients. She appears to understand.     It will be difficult (given that my schedule is now 100% booked for the next year) for me to see her back once the test results are back in a month or so, let alone to manage her through the \"tactical maneuvers\" of trying various medications to gain better control over the disease.  She understands it will be important for her to take time to try the various available non-sedating H1 blockers and H2 blockers.  We will have her return in about three months to see the senior " "physician's assistant here, Brie Rodríguezt, who now is kindly assisting me with my follow-up patients. Ms. Obrien will work through the above-noted tactical maneuvers with the patient, and I'll see her roughly every 6-12 months for \"strategic reassessments.\"     As is usually the case with initial consultations for mast cell disease, this was a long encounter, 120 minutes in total, with 70 minutes spent in counseling. The patient indicated that all of her questions at this time had been answered to her satisfaction, and she expressed appreciation for the time I had given her.      Typed, reviewed, and electronically signed by: Keegan Willson M.D.     DT: 06/01/17 01:19 AM    cc: 1. Jessica Lyons M.D. ([facility], [address], [place] [address], [phone], fax [phone])  2. Please also mail a copy to the patient at her home address as listed in the system.      Addendum (7/16/17): Labs are back and clearly show we're headed in the right diagnostic direction, but we're still not quite at the endpoint I'd like to see. Basically, all the tests were 100% normal/unrevealing (including Lyme antibodies) except for some intriguing biopsy findings (more in a moment) and mild macrocytosis () and a plasma prostaglandin D2 level that was right at the very top end of the normal range (115 pg/ml, normal ).  Furthermore, both the random and 24-hour urinary prostaglandin D2 levels were far below the lower limit of that test's normal range, suggesting there may have been specimen mishandling (i.e., loss of thermal integrity) at some point between the point of collection and the point of processing at the reference lab in [facility] -- and therefore it's also possible that the \"normal\" plasma prostaglandin D2 result might have been a falsely low reading.  I note the serum chromogranin A, at 89 ng/ml (normal 0-95), and 24-hour urinary N-methylhistamine, at 180 " mcg/g Cr (normal ), were pretty close to those tests' upper limits of normal, too.    With regard to the biopsies, a duodenal biopsy from 4/11 was originally read as normal but on restaining with  showed up to 20-25 mast cells per high power field (hpf), slightly above the threshold (20/hpf) which in my experience most pathologists feel (usually based on the Car et al., Arch Pathol Lab Med 2006 paper) is the upper limit of normal for this parameter.  Furthermore, a biopsy from the second part of the duodenum in 4/12 showed 35-40/hpf on  staining, and another biopsy in 4/14 from the same site showed 25-30/hpf.     Thus, we have 2-3 biopsies which show more mast cells than usually thought normal, but we do not yet have a single elevated mast cell  level absolutely demonstrating mast cell activation, so I'm reluctant to make a definitive diagnostic declaration of MCAS.  The plasma prostaglandin D2 level, chromogranin A level, and 24-hour urinary N-methylhistamine level were provocative but, technically, normal.    I think the dilemma at this point is whether, out of economic interests, only the plasma prostaglandin D2 and serum chromogranin A and 24-hour urinary N-methylhistamine should be repeated (implying, of course, that if they come back negative, the entire suite of mast cell  tests will then need to be repeated) or -- since mast cell mediators do tend to fluctuate a good bit from day to day, even hour to hour -- out of interests in diagnostic expediency we should just go ahead at this time and repeat the entire mast cell  testing suite.     I'll ask my clinic nurse coordinator, Nighat Mckinney RN, to contact the patient to discuss her preference as to how to proceed.  She would need to again take care to avoid all NSAID (including salicylates; review of the information about salicylate-containing foods and household and personal care products at  http://www.salicylatesensitivity.com may be helpful) and PPI use for 5+ days prior to specimen collection.  It would be best (not strictly necessary, but nevertheless optimal) to submit the next round of specimens on a particularly symptomatic day.  It will be important for her to again attend rigorously to continuous chilling of the 24-hour urine collection.    I'm departing [facility] at the end of this month.  If results arrive before my departure, I'll provide another addendum with interpretation of results and recommendations regarding next steps, but if results arrive after my departure, the patient will need to provide them to me as a single complete package and then I'll provide an interpretation of results and recommendations regarding next steps.  All of my patients will be informed of my future contact information prior to my departure.    I spent another 20 minutes preparing this addendum, but as this was not face-to-face time, I did not submit any additional billing.      Typed, reviewed, and electronically signed by: Keegan Willson M.D.     DT: 07/16/17 03:24 PM    cc: 1. Jessica Lyons M.D. ([facility], [address], [place] [address], [phone], fax [phone])  2. Please also mail a copy to the patient at her home address as listed in the system.    Keegan Willson MD

## 2017-05-31 NOTE — LETTER
2017      RE: Kenton Mathew  [address]  [facility] [address]       Patient: Kenton Mathew (MRN [id number])   Encounter Date: May 31, 2017  Referring: Jessica Lyons M.D. ([facility],  [address], [place] [address], [phone], fax [phone])  Attending: Keegan Willson M.D.     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  At the beginning of the encounter, I reviewed all available chart data, consisting solely of about 50 pages of an outside genomic analysis whose validity immediately seemed questionable (details below), plus a modicum of lab test reports from [place] accessed via her phone, plus an extensive assortment of labs accessed via [facility], but there really were no prior useful clinical notes for me to review, so I just proceeded to take a full history from scratch.  Note the patient was recommended by Dr. Bernarda Zenker of [facility] in [place], [place] to come here for evaluation, but the patient asked that I send a copy of my note only to her primary provider, Dr. Lyons, and to the patient herself.  The history here is a bit complex, and it's probably best to just present it all chronologically, blending HPI and PMH as follows.  The patient's history of chronic multisystem polymorbidity of generally inflammatory theme begins with onset of chronic constipation in college or possibly even earlier; somewhat oddly, she says she can't remember anything that happened to her before that point.  She says that otherwise she was perfectly healthy while growing up and got  at 24, but then the marriage started souring and she  "started suffering a lot of stress from this in her late 20s.  At 31 she suffered her first significant health problem, a vertebral artery dissection causing TIAs, visual blurriness, anesthesia in the right hand, and mild dysphasia, though fortunately she gained complete neurologic recovery.  She finally  at 32, remarried at 33, and then at 34 had her first pregnancy, which went quite smoothly, ending with a scheduled .  Her second pregnancy came at 36, also went smoothly, and also ended with a scheduled .  An episode of abdominal pain at 37, though, led to an appendectomy, and then at 38 she suddenly started suffering chronic fatigue (attributed by her physicians to the demands of ) and a new left proximal dysphagia and left throat \"lump\" which were evaluated by an ENT physician and diagnosed as globus.  A swallow study was normal.  At 40, despite no apparent troubles tolerating any foods, an EGD done to further investigate her dysphagia found \"total atrophy\" (per the patient's recollection; again, I have no records) and celiac disease was diagnosed.  She switched to a gluten-free diet and felt better for two weeks, but then her chronic fatigue relapsed.  She says she tried several other diets (elimination, paleo, low-histamine), but none ever provided any clear benefit.  She then saw a celiac disease specialist at [facility] who confirmed this diagnosis but felt it did not explain her symptoms, so she was referred to Neurology at [facility].  She says an EMG was negative, so she was referred to [facility]'s chronic fatigue syndrome/fibromyalgia center, where at 41 she was diagnosed with fibromyalgia.  She says this is also when there emerged the mild macrocytosis which has persisted, stable and unexplained, ever since.  Hematology evaluation at [facility] when she was 44 failed to identify a cause.  No marrow examination was performed.  She says three female cousins who have problems similar to hers " "were all found to have some sort of an MTHFR mutation, and she was recommended to see Dr. Zenker for this.  She says Dr. Zenker prescribed multiple supplements to treat the patient's MTHFR mutation, but none of them seemed to help her feel any better.  She says Dr. 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  The patient says that while she was waiting for her appointment here, Dr. Zenker asked her to empirically try a full (H1 + H2) histamine receptor blockade.  She cannot remember specifically which drugs she tried, but she is pretty sure it was just one H1 blocker and one H2 blocker, and they did not discernibly help, so they were stopped.  She also knows her ferritin and vitamin D have recently been near the lower ends of those tests' normal ranges.  She also reports a number of other (typically episodic, waxing/waning) problems which have emerged in the last 3-5 years including headaches, right eye pain, right pupil enlargement, plugging of her ears, bags under her eyes, jaw pain, mental fog, diffusely migratory joint stiffness and pain, muscle pain and stiffness, palpitations, insomnia, frequent waking, swelling of the nose, coryza, anxiety, foot cramping, arms falling asleep, and various rashes about the abdomen and extremities, especially \"red dots\" and non-pruritic welts on her abdomen, sometimes also on the inner arms and thighs, plus other, rough itchy rashes on her arms in the last year.    On a full review of systems, although she denies any issues with flushing, sweats, visual anomalies, unexplained fluctuations in weight or appetite, easy bleeding or bruising, sinonasal congestion, post-nasal drip, irritation of " "the nose/mouth/throat, chest pain/discomfort, gastroesophageal reflux, vomiting, diarrhea, abdominal pain, urinary urgency, syncope, onychodystrophy, or joint hypermobility, she endorses a wide range of other, chronic/recurrent, episodic/intermittent and/or waxing/waning issues including subjective (rarely objective) fevers, feeling cold much of the time, fatigue (often to the point of exhaustion), malaise, headaches, diffusely migratory aching/pain, diffusely migratory pruritus, irritation of the eyes, plugging of the ears, occasional tinnitus, episodes of spontaneous epistaxis since about age 44, coryza, proximal dysphagia, dyspnea (on occasion she \"just can't catch a deep breath\") but no wheezing, palpitations, nausea, constipation, (usually post-prandial) abdominal bloating, diffusely migratory weakness, edema diffusely migratory about the eyes, nose, throat, and ears, diffusely migratory tingling/numbness (typically about the upper extremities), diffusely migratory occasional bilateral cervical adenitis (but no adenopathy), her first episode ever of presyncope that she can remember occurring just yesterday in the shower, cognitive dysfunction (particularly memory, concentration, and word-finding), hair loss, receding gums, diffusely migratory rashes (typically patchy macular erythema), hives, insomnia, frequent waking, depression (a number of medications were tried, but none ever appeared to help), and vigorous insect bite reactions.  She notes she is asymptomatic if she eats gluten-containing foods.  She thinks she may have an anal fissure.  Complete ROS o/w neg.    Family history is notable for a father with polymyalgia rheumatica, hyperlipidemia, hypertension, and glaucoma, a mother with celiac disease, a benign thyroid nodule which led to a thyroidectomy and thyroid replacement therapy, plus a chronic problem with an itchy rash on her back (just as the patient herself now endures), a brother with asthma, a " "sister with asthma and \"muscle deterioration,\" another brother with celiac disease and asthma, and two healthy children. The patient denies any history of smoking, significant alcohol use (note she does tolerate alcohol), or illegal substance use.    She lists her current medications as vitamin D 4,000 units qd, a daily multivitamin, and trazodone 100 mg qd.  She lists her current allergies as GI upset to gluten, and yet, again, she tells me she can eat gluten-containing foods without any difficulty..    Exam finds a thin (but not cachectic) woman in no apparent distress, pleasant, cooperative, fully alert and oriented, easily independently ambulatory, presenting by herself.  She has many small moles, mostly about her bilateral upper extremities.  Vitals per chart, all unremarkable.  Key findings are her thin but comfortable general appearance, HEENT benign, no plethora/pallor/diaphoresis/jaundice/bleeding/bruising, perhaps a very subtle residual patchy macular lightly erythematous rash about the bilateral shins (plus a healing rash biopsy site on the right shin), neck reported as just a little bit stiff (chronically) in all axes and directions on range of motion testing, no JVD or thyromegaly or carotid bruits, no palpable adenopathy or tenderness at any of the usual node-bearing sites, clear lungs, regular heart with no adventitious sounds, abdomen benign, no peripheral edema, neuro grossly intact.  On a light scratch test on the upper back, moderately bright dermatographism (erythroderma only, no hives) quickly emerged and was fully sustained when last checked 10 minutes later.     Review of available lab data was notable for normal CMP, TSH, LDH, pyridoxal 5-phosphate, anti-thyroid peroxidase antibodies, DARRYL panel, vitamin E, anti-acetylcholine-receptor antibodies, anti-striated muscle antibodies, angiotensin converting enzyme, creatine kinase, cortisol, ESR, INR, CCP, IgA, RF, and CRP.  Folate was high.  There " "was a slight increase in carotene.  Celiac antibody tests at [facility] were negative.  Serum copper and zinc were occasionally slightly low.  The genomic analysis is riddled with \"red alerts,\" but when I started by looking at the SANDY-related SNPs, I saw that she was marked as having homozygosity for the risk allele for two of these SNPs, and yet when I researched these SNPs further on-line, it was immediately apparent that her genotype for each of these SNPs was actually normal, not high-risk, so both of the \"red alerts\" for possible SANDY deficiency on her report appeared incorrect to me, and I felt at that point that examination of the rest of the report would not be sufficiently productive to spend more time on the matter.    Lab reports on [facility] show a number of curiosities.  With regard to CBCs, in 3/05 and 4/05 she had a borderline anemia and mild macrocytosis (103-105) (but a normal RDW), and then suddenly in 9/05 her CBCDs are completely normal (MCVs 95-96), followed in 5/09 with a normal CBCD with an MCV of 87, then in '10-'11 mild anemia with low-normal MCVs and minimal thrombocytosis, and then since around '15 she's had a mostly normal H/H but an MCV of 102 (and, again, a very normal RDW).  An IgA tissue transglutaminase antibody in '11 was quite high, but multiple celiac serologies since '15 have all been completely negative.  GAEL negative.  Lipids unremarkable.  CMP and magnesium normal.  Whole blood histamine in 12/15 was normal.  Homocysteine normal.  Hematopathology review of the blood smear in '15 confirmed the \"slight macrocytosis\" but could not identify a cause.  The right foreleg rash biopsy in 5/17 was read as \"subacute spongiotic dermatitis with mild superficial perivascular lymphocytic inflammation admixed with a few extravasated red blood cells...the findings are relatively mild and not clearly diagnostic, but would be most supportive of some form of eczematous dermatitis or possibly the " "eczematous variant of pigmented purpuric dermatitis.\"    A/P: Seguns to Dr. Zenker, because I think she's likely right in her suspicion that a mast cell activation disorder (MCAD) -- and far more likely the far more prevalent (if only recently recognized) type termed mast cell activation syndrome (MCAS) than the rare (but long recognized) type termed mastocytosis -- is at the root of most or all of the patient's chronic multisystem polymorbidity of generally inflammatory theme.  Beyond all the other diseases already ruled out by the great extent of testing she's undergone to date, I don't know of any other human disease that comes anywhere near as close as MCAS does to accounting, directly or indirectly, for the full range and chronicity of all the symptoms and findings here. Literally everything here is consistent with what MCAS can and often does do, from the arterial dissection to her specific phrasing of how she occasionally just can't catch a deep breath (*precisely* the phrasing I most commonly hear from MCAS patients reporting dyspnea) plus the specific phrasing of the skin biopsy report, which, though non-specific, precisely matches what I see most often in skin biopsies from MCAS patients.  (Of note, too, I'm not saying that any of her prior diagnoses are wrong (though I'm not entirely confident about the celiac disease diagnosis). It's just that each of them accounts for only one subset or another of all that's gone on in her, while MCAS can account for all of everything that's been going on in her.) All of that said, she needs objective laboratory evidence of improperly behaving mast cells, toward which end I ordered the range of testing I typically check in assessing for MCAS, namely, a CBCD, CMP, magnesium, PT/PTT, chilled serum tryptase and chromogranin A, chilled plasma prostaglandin D2 and histamine and heparin, chilled random urinary prostaglandin D2 and N-methylhistamine and leukotriene E4 and " "2,3-dinor 11-beta-prostaglandin-F2-alpha, and chilled 24-hour urinary prostaglandin D2 and N-methylhistamine and leukotriene E4 and 2,3-dinor 11-beta-prostaglandin-F2-alpha. I also ordered an anti-IgE IgG and an anti-IgE receptor antibody, which won't be useful diagnostically but may influence certain therapeutic decisions down the road if MCAS is proven. We confirmed she has abstained from confounding use of proton pump inhibitors (PPIs) and non-steroidal anti-inflammatory drugs (NSAIDs) for the prior 5+ days. We provided her careful written and verbal instructions regarding the 24-hour urine collection including the importance of continuous chilling of the specimen throughout collection and transport.  She understands the various reasons why a single round of  testing might yield all negative results, and she understands that if this happens, it of course doesn't even begin to invalidate/refute/negate even a single element of her history (which strongly argues for a mast cell disorder). Her history is what it is, so if we get a negative round of testing, I'll simply recommend repeat testing (which she'll of course need to pursue locally), albeit with specimen collection at that point preferably deferred to a particularly symptomatic day. I also asked her to work with our clinic nurse coordinator, Nighat Mckinney RN, to try to arrange for her old GI tract biopsies and resection specimens to be reassessed (either locally or here) with  staining, as mast cells can't be seen with routine H&E staining and I've often seen \"normal\" or \"mildly chronically inflamed\" GI tract biopsies in MCAS patients \"light up\" on  staining revealing mast cell disease. If 2-3 rounds of blood and urine testing yields all negative results, and if restaining of any available old biopsies either can't be done for some reason or yields negative results, the \"backup plan\" will be to pursue a fresh set of bidirectional " endoscopies to obtain biopsies throughout the luminal GI tract for pathologic evaluation specifically (using  staining at a minimum) for increased (>20/hpf) numbers of mast cells (as often seen mildly-moderately in MCAS, though not to anywhere near the degree (or patterns) seen in mastocytosis).     Although I cautioned her that she doesn't have a definitive diagnosis of MCAS yet, we did engage in extended discussion today about general aspects of MCAS including its essential nature of chronic multisystem polymorbidity of a generally inflammatory theme, the availability of many therapies shown helpful in various MCAD/MCAS patients, the good likelihood of (eventually) finding therapy that will help her achieve the goal of feeling significantly better than the pre-treatment baseline the majority of the time, and the present frustrating absence of any biomarkers that can help predict which of the many available, potentially helpful therapies is most likely to benefit the individual patient. She understands that if we are able to secure a diagnosis of MCAS, she will be dependent on pursuing -- in patient, persistent, and methodical fashion, trying as hard as possible to make just one change at a time -- trials of the various therapies (which, again, lacking predictive biomarkers, we generally pursue in order of escalating cost). She understands that once all test results are available about a month from now, I will write an addendum to this note (to again be distributed to all of her physicians listed below) providing my interpretation of the results and recommendations for next steps.     I told her that the only therapy I am willing to empirically recommend in a patient with suspected, but not yet proven, MCAS is a full (H1 + H2) histamine receptor blockade (e.g., cetirizine 10 mg bid or fexofenadine  mg bid + famotidine 20 mg bid or ranitidine  mg bid). She understands that some patients find that  "alternative H1 or H2 blockers serve them better, some patients find that tid dosing serves them better than bid dosing, and some patients find that slightly higher dosages (e.g., 20-30 mg rather than 10 mg of cetirizine, or 150 mg rather than 75 mg of ranitidine) serve them better than lower dosages. She will need to \"experiment,\" taking 2-4 weeks with each non-sedating H1 blocker (loratadine, cetirizine, fexofenadine, and levocetirizine, each at standard OTC doses but taken bid) to identify which serves her best, and then do the same with (two or more, each in turn) of the OTC H2 blockers (famotidine, ranitidine, cimetidine, and nizatadine) to identify which H2 blocker serves here best.  I also cautioned her that MCAD/MCAS patients have quite a predilection to adversely react not necessarily to the active ingredients in their medications but rather to the excipients (fillers, binders, dyes, preservatives, etc.) in their medications. As such, if she experiences an adverse reaction very shortly after beginning an ordinarily well tolerated medication (as is certainly the case with the H1 and H2 blockers), excipient reactivity must be suspected and she should promptly work with her pharmacist to review the medication's ingredient list, try to identify the likely offending ingredient, and try one or more alternative formulations of the medication which don't share any of the offending formulation's excipients. She appears to understand.     It will be difficult (given that my schedule is now 100% booked for the next year) for me to see her back once the test results are back in a month or so, let alone to manage her through the \"tactical maneuvers\" of trying various medications to gain better control over the disease.  She understands it will be important for her to take time to try the various available non-sedating H1 blockers and H2 blockers.  We will have her return in about three months to see the senior " "physician's assistant here, Brie Rodríguezt, who now is kindly assisting me with my follow-up patients. Ms. Obrien will work through the above-noted tactical maneuvers with the patient, and I'll see her roughly every 6-12 months for \"strategic reassessments.\"     As is usually the case with initial consultations for mast cell disease, this was a long encounter, 120 minutes in total, with 70 minutes spent in counseling. The patient indicated that all of her questions at this time had been answered to her satisfaction, and she expressed appreciation for the time I had given her.      Typed, reviewed, and electronically signed by: Keegan Willson M.D.     DT: 06/01/17 01:19 AM    cc: 1. Jessica Lyons M.D. ([facility], [address], [place] [address], [phone], fax [phone])  2. Please also mail a copy to the patient at her home address as listed in the system.      Addendum (7/16/17): Labs are back and clearly show we're headed in the right diagnostic direction, but we're still not quite at the endpoint I'd like to see. Basically, all the tests were 100% normal/unrevealing (including Lyme antibodies) except for some intriguing biopsy findings (more in a moment) and mild macrocytosis () and a plasma prostaglandin D2 level that was right at the very top end of the normal range (115 pg/ml, normal ).  Furthermore, both the random and 24-hour urinary prostaglandin D2 levels were far below the lower limit of that test's normal range, suggesting there may have been specimen mishandling (i.e., loss of thermal integrity) at some point between the point of collection and the point of processing at the reference lab in [facility] -- and therefore it's also possible that the \"normal\" plasma prostaglandin D2 result might have been a falsely low reading.  I note the serum chromogranin A, at 89 ng/ml (normal 0-95), and 24-hour urinary N-methylhistamine, at 180 " mcg/g Cr (normal ), were pretty close to those tests' upper limits of normal, too.    With regard to the biopsies, a duodenal biopsy from 4/11 was originally read as normal but on restaining with  showed up to 20-25 mast cells per high power field (hpf), slightly above the threshold (20/hpf) which in my experience most pathologists feel (usually based on the Car et al., Arch Pathol Lab Med 2006 paper) is the upper limit of normal for this parameter.  Furthermore, a biopsy from the second part of the duodenum in 4/12 showed 35-40/hpf on  staining, and another biopsy in 4/14 from the same site showed 25-30/hpf.     Thus, we have 2-3 biopsies which show more mast cells than usually thought normal, but we do not yet have a single elevated mast cell  level absolutely demonstrating mast cell activation, so I'm reluctant to make a definitive diagnostic declaration of MCAS.  The plasma prostaglandin D2 level, chromogranin A level, and 24-hour urinary N-methylhistamine level were provocative but, technically, normal.    I think the dilemma at this point is whether, out of economic interests, only the plasma prostaglandin D2 and serum chromogranin A and 24-hour urinary N-methylhistamine should be repeated (implying, of course, that if they come back negative, the entire suite of mast cell  tests will then need to be repeated) or -- since mast cell mediators do tend to fluctuate a good bit from day to day, even hour to hour -- out of interests in diagnostic expediency we should just go ahead at this time and repeat the entire mast cell  testing suite.     I'll ask my clinic nurse coordinator, Nighat Mckinney RN, to contact the patient to discuss her preference as to how to proceed.  She would need to again take care to avoid all NSAID (including salicylates; review of the information about salicylate-containing foods and household and personal care products at  http://www.salicylatesensitivity.com may be helpful) and PPI use for 5+ days prior to specimen collection.  It would be best (not strictly necessary, but nevertheless optimal) to submit the next round of specimens on a particularly symptomatic day.  It will be important for her to again attend rigorously to continuous chilling of the 24-hour urine collection.    I'm departing [facility] at the end of this month.  If results arrive before my departure, I'll provide another addendum with interpretation of results and recommendations regarding next steps, but if results arrive after my departure, the patient will need to provide them to me as a single complete package and then I'll provide an interpretation of results and recommendations regarding next steps.  All of my patients will be informed of my future contact information prior to my departure.    I spent another 20 minutes preparing this addendum, but as this was not face-to-face time, I did not submit any additional billing.      Typed, reviewed, and electronically signed by: Keegan Willson M.D.     DT: 07/16/17 03:24 PM    cc: 1. Jessica Lyons M.D. ([facility], [address], [place] [address], [phone], fax [phone])  2. Please also mail a copy to the patient at her home address as listed in the system.    Keegan Willson MD

## 2017-05-31 NOTE — LETTER
2017      RE: Kenton Mathew  [address]  [facility] [address]       Patient: Kenton Mathew (MRN [id number])   Encounter Date: May 31, 2017  Referring: Jessica Lyons M.D. ([facility],  [address], [place] [address], [phone], fax [phone])  Attending: Keegan Willosn M.D.     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  At the beginning of the encounter, I reviewed all available chart data, consisting solely of about 50 pages of an outside genomic analysis whose validity immediately seemed questionable (details below), plus a modicum of lab test reports from [facility] accessed via her phone, plus an extensive assortment of labs accessed via [facility], but there really were no prior useful clinical notes for me to review, so I just proceeded to take a full history from scratch.  Note the patient was recommended by Dr. Bernarda Zenker of [facility] in [place], [place] to come here for evaluation, but the patient asked that I send a copy of my note only to her primary provider, Dr. Lyons, and to the patient herself.  The history here is a bit complex, and it's probably best to just present it all chronologically, blending HPI and PMH as follows.  The patient's history of chronic multisystem polymorbidity of generally inflammatory theme begins with onset of chronic constipation in college or possibly even earlier; somewhat oddly, she says she can't remember anything that happened to her before that point.  She says that otherwise she was perfectly healthy while growing up and got  at 24, but then the marriage started souring and she  "started suffering a lot of stress from this in her late 20s.  At 31 she suffered her first significant health problem, a vertebral artery dissection causing TIAs, visual blurriness, anesthesia in the right hand, and mild dysphasia, though fortunately she gained complete neurologic recovery.  She finally  at 32, remarried at 33, and then at 34 had her first pregnancy, which went quite smoothly, ending with a scheduled .  Her second pregnancy came at 36, also went smoothly, and also ended with a scheduled .  An episode of abdominal pain at 37, though, led to an appendectomy, and then at 38 she suddenly started suffering chronic fatigue (attributed by her physicians to the demands of ) and a new left proximal dysphagia and left throat \"lump\" which were evaluated by an ENT physician and diagnosed as globus.  A swallow study was normal.  At 40, despite no apparent troubles tolerating any foods, an EGD done to further investigate her dysphagia found \"total atrophy\" (per the patient's recollection; again, I have no records) and celiac disease was diagnosed.  She switched to a gluten-free diet and felt better for two weeks, but then her chronic fatigue relapsed.  She says she tried several other diets (elimination, paleo, low-histamine), but none ever provided any clear benefit.  She then saw a celiac disease specialist at [facility] who confirmed this diagnosis but felt it did not explain her symptoms, so she was referred to Neurology at [facility].  She says an EMG was negative, so she was referred to [facility] fatigue [facility]/fibromy[facility], where at 41 she was diagnosed with fibromyalgia.  She says this is also when there emerged the mild macrocytosis which has persisted, stable and unexplained, ever since.  Hematology evaluation at [facility] when she was 44 failed to identify a cause.  No marrow examination was performed.  She says three female cousins who have problems similar to hers " "were all found to have some sort of an MTHFR mutation, and she was recommended to see Dr. Zenker for this.  She says Dr. Zenker prescribed multiple supplements to treat the patient's MTHFR mutation, but none of them seemed to help her feel any better.  She says Dr. 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  The patient says that while she was waiting for her appointment here, Dr. Zenker asked her to empirically try a full (H1 + H2) histamine receptor blockade.  She cannot remember specifically which drugs she tried, but she is pretty sure it was just one H1 blocker and one H2 blocker, and they did not discernibly help, so they were stopped.  She also knows her ferritin and vitamin D have recently been near the lower ends of those tests' normal ranges.  She also reports a number of other (typically episodic, waxing/waning) problems which have emerged in the last 3-5 years including headaches, right eye pain, right pupil enlargement, plugging of her ears, bags under her eyes, jaw pain, mental fog, diffusely migratory joint stiffness and pain, muscle pain and stiffness, palpitations, insomnia, frequent waking, swelling of the nose, coryza, anxiety, foot cramping, arms falling asleep, and various rashes about the abdomen and extremities, especially \"red dots\" and non-pruritic welts on her abdomen, sometimes also on the inner arms and thighs, plus other, rough itchy rashes on her arms in the last year.    On a full review of systems, although she denies any issues with flushing, sweats, visual anomalies, unexplained fluctuations in weight or appetite, easy bleeding or bruising, sinonasal congestion, post-nasal drip, irritation of " "the nose/mouth/throat, chest pain/discomfort, gastroesophageal reflux, vomiting, diarrhea, abdominal pain, urinary urgency, syncope, onychodystrophy, or joint hypermobility, she endorses a wide range of other, chronic/recurrent, episodic/intermittent and/or waxing/waning issues including subjective (rarely objective) fevers, feeling cold much of the time, fatigue (often to the point of exhaustion), malaise, headaches, diffusely migratory aching/pain, diffusely migratory pruritus, irritation of the eyes, plugging of the ears, occasional tinnitus, episodes of spontaneous epistaxis since about age 44, coryza, proximal dysphagia, dyspnea (on occasion she \"just can't catch a deep breath\") but no wheezing, palpitations, nausea, constipation, (usually post-prandial) abdominal bloating, diffusely migratory weakness, edema diffusely migratory about the eyes, nose, throat, and ears, diffusely migratory tingling/numbness (typically about the upper extremities), diffusely migratory occasional bilateral cervical adenitis (but no adenopathy), her first episode ever of presyncope that she can remember occurring just yesterday in the shower, cognitive dysfunction (particularly memory, concentration, and word-finding), hair loss, receding gums, diffusely migratory rashes (typically patchy macular erythema), hives, insomnia, frequent waking, depression (a number of medications were tried, but none ever appeared to help), and vigorous insect bite reactions.  She notes she is asymptomatic if she eats gluten-containing foods.  She thinks she may have an anal fissure.  Complete ROS o/w neg.    Family history is notable for a father with polymyalgia rheumatica, hyperlipidemia, hypertension, and glaucoma, a mother with celiac disease, a benign thyroid nodule which led to a thyroidectomy and thyroid replacement therapy, plus a chronic problem with an itchy rash on her back (just as the patient herself now endures), a brother with asthma, a " "sister with asthma and \"muscle deterioration,\" another brother with celiac disease and asthma, and two healthy children. The patient denies any history of smoking, significant alcohol use (note she does tolerate alcohol), or illegal substance use.    She lists her current medications as vitamin D 4,000 units qd, a daily multivitamin, and trazodone 100 mg qd.  She lists her current allergies as GI upset to gluten, and yet, again, she tells me she can eat gluten-containing foods without any difficulty..    Exam finds a thin (but not cachectic) woman in no apparent distress, pleasant, cooperative, fully alert and oriented, easily independently ambulatory, presenting by herself.  She has many small moles, mostly about her bilateral upper extremities.  Vitals per chart, all unremarkable.  Key findings are her thin but comfortable general appearance, HEENT benign, no plethora/pallor/diaphoresis/jaundice/bleeding/bruising, perhaps a very subtle residual patchy macular lightly erythematous rash about the bilateral shins (plus a healing rash biopsy site on the right shin), neck reported as just a little bit stiff (chronically) in all axes and directions on range of motion testing, no JVD or thyromegaly or carotid bruits, no palpable adenopathy or tenderness at any of the usual node-bearing sites, clear lungs, regular heart with no adventitious sounds, abdomen benign, no peripheral edema, neuro grossly intact.  On a light scratch test on the upper back, moderately bright dermatographism (erythroderma only, no hives) quickly emerged and was fully sustained when last checked 10 minutes later.     Review of available lab data was notable for normal CMP, TSH, LDH, pyridoxal 5-phosphate, anti-thyroid peroxidase antibodies, DARRYL panel, vitamin E, anti-acetylcholine-receptor antibodies, anti-striated muscle antibodies, angiotensin converting enzyme, creatine kinase, cortisol, ESR, INR, CCP, IgA, RF, and CRP.  Folate was high.  There " "was a slight increase in carotene.  Celiac antibody tests at [facility] were negative.  Serum copper and zinc were occasionally slightly low.  The genomic analysis is riddled with \"red alerts,\" but when I started by looking at the SANDY-related SNPs, I saw that she was marked as having homozygosity for the risk allele for two of these SNPs, and yet when I researched these SNPs further on-line, it was immediately apparent that her genotype for each of these SNPs was actually normal, not high-risk, so both of the \"red alerts\" for possible SANDY deficiency on her report appeared incorrect to me, and I felt at that point that examination of the rest of the report would not be sufficiently productive to spend more time on the matter.    Lab reports on [facility] show a number of curiosities.  With regard to CBCs, in 3/05 and 4/05 she had a borderline anemia and mild macrocytosis (103-105) (but a normal RDW), and then suddenly in 9/05 her CBCDs are completely normal (MCVs 95-96), followed in 5/09 with a normal CBCD with an MCV of 87, then in '10-'11 mild anemia with low-normal MCVs and minimal thrombocytosis, and then since around '15 she's had a mostly normal H/H but an MCV of 102 (and, again, a very normal RDW).  An IgA tissue transglutaminase antibody in '11 was quite high, but multiple celiac serologies since '15 have all been completely negative.  GAEL negative.  Lipids unremarkable.  CMP and magnesium normal.  Whole blood histamine in 12/15 was normal.  Homocysteine normal.  Hematopathology review of the blood smear in '15 confirmed the \"slight macrocytosis\" but could not identify a cause.  The right foreleg rash biopsy in 5/17 was read as \"subacute spongiotic dermatitis with mild superficial perivascular lymphocytic inflammation admixed with a few extravasated red blood cells...the findings are relatively mild and not clearly diagnostic, but would be most supportive of some form of eczematous dermatitis or possibly the " "eczematous variant of pigmented purpuric dermatitis.\"    A/P: Seguns to Dr. Zenker, because I think she's likely right in her suspicion that a mast cell activation disorder (MCAD) -- and far more likely the far more prevalent (if only recently recognized) type termed mast cell activation syndrome (MCAS) than the rare (but long recognized) type termed mastocytosis -- is at the root of most or all of the patient's chronic multisystem polymorbidity of generally inflammatory theme.  Beyond all the other diseases already ruled out by the great extent of testing she's undergone to date, I don't know of any other human disease that comes anywhere near as close as MCAS does to accounting, directly or indirectly, for the full range and chronicity of all the symptoms and findings here. Literally everything here is consistent with what MCAS can and often does do, from the arterial dissection to her specific phrasing of how she occasionally just can't catch a deep breath (*precisely* the phrasing I most commonly hear from MCAS patients reporting dyspnea) plus the specific phrasing of the skin biopsy report, which, though non-specific, precisely matches what I see most often in skin biopsies from MCAS patients.  (Of note, too, I'm not saying that any of her prior diagnoses are wrong (though I'm not entirely confident about the celiac disease diagnosis). It's just that each of them accounts for only one subset or another of all that's gone on in her, while MCAS can account for all of everything that's been going on in her.) All of that said, she needs objective laboratory evidence of improperly behaving mast cells, toward which end I ordered the range of testing I typically check in assessing for MCAS, namely, a CBCD, CMP, magnesium, PT/PTT, chilled serum tryptase and chromogranin A, chilled plasma prostaglandin D2 and histamine and heparin, chilled random urinary prostaglandin D2 and N-methylhistamine and leukotriene E4 and " "2,3-dinor 11-beta-prostaglandin-F2-alpha, and chilled 24-hour urinary prostaglandin D2 and N-methylhistamine and leukotriene E4 and 2,3-dinor 11-beta-prostaglandin-F2-alpha. I also ordered an anti-IgE IgG and an anti-IgE receptor antibody, which won't be useful diagnostically but may influence certain therapeutic decisions down the road if MCAS is proven. We confirmed she has abstained from confounding use of proton pump inhibitors (PPIs) and non-steroidal anti-inflammatory drugs (NSAIDs) for the prior 5+ days. We provided her careful written and verbal instructions regarding the 24-hour urine collection including the importance of continuous chilling of the specimen throughout collection and transport.  She understands the various reasons why a single round of  testing might yield all negative results, and she understands that if this happens, it of course doesn't even begin to invalidate/refute/negate even a single element of her history (which strongly argues for a mast cell disorder). Her history is what it is, so if we get a negative round of testing, I'll simply recommend repeat testing (which she'll of course need to pursue locally), albeit with specimen collection at that point preferably deferred to a particularly symptomatic day. I also asked her to work with our clinic nurse coordinator, Nighat Mckinney RN, to try to arrange for her old GI tract biopsies and resection specimens to be reassessed (either locally or here) with  staining, as mast cells can't be seen with routine H&E staining and I've often seen \"normal\" or \"mildly chronically inflamed\" GI tract biopsies in MCAS patients \"light up\" on  staining revealing mast cell disease. If 2-3 rounds of blood and urine testing yields all negative results, and if restaining of any available old biopsies either can't be done for some reason or yields negative results, the \"backup plan\" will be to pursue a fresh set of bidirectional " endoscopies to obtain biopsies throughout the luminal GI tract for pathologic evaluation specifically (using  staining at a minimum) for increased (>20/hpf) numbers of mast cells (as often seen mildly-moderately in MCAS, though not to anywhere near the degree (or patterns) seen in mastocytosis).     Although I cautioned her that she doesn't have a definitive diagnosis of MCAS yet, we did engage in extended discussion today about general aspects of MCAS including its essential nature of chronic multisystem polymorbidity of a generally inflammatory theme, the availability of many therapies shown helpful in various MCAD/MCAS patients, the good likelihood of (eventually) finding therapy that will help her achieve the goal of feeling significantly better than the pre-treatment baseline the majority of the time, and the present frustrating absence of any biomarkers that can help predict which of the many available, potentially helpful therapies is most likely to benefit the individual patient. She understands that if we are able to secure a diagnosis of MCAS, she will be dependent on pursuing -- in patient, persistent, and methodical fashion, trying as hard as possible to make just one change at a time -- trials of the various therapies (which, again, lacking predictive biomarkers, we generally pursue in order of escalating cost). She understands that once all test results are available about a month from now, I will write an addendum to this note (to again be distributed to all of her physicians listed below) providing my interpretation of the results and recommendations for next steps.     I told her that the only therapy I am willing to empirically recommend in a patient with suspected, but not yet proven, MCAS is a full (H1 + H2) histamine receptor blockade (e.g., cetirizine 10 mg bid or fexofenadine  mg bid + famotidine 20 mg bid or ranitidine  mg bid). She understands that some patients find that  "alternative H1 or H2 blockers serve them better, some patients find that tid dosing serves them better than bid dosing, and some patients find that slightly higher dosages (e.g., 20-30 mg rather than 10 mg of cetirizine, or 150 mg rather than 75 mg of ranitidine) serve them better than lower dosages. She will need to \"experiment,\" taking 2-4 weeks with each non-sedating H1 blocker (loratadine, cetirizine, fexofenadine, and levocetirizine, each at standard OTC doses but taken bid) to identify which serves her best, and then do the same with (two or more, each in turn) of the OTC H2 blockers (famotidine, ranitidine, cimetidine, and nizatadine) to identify which H2 blocker serves here best.  I also cautioned her that MCAD/MCAS patients have quite a predilection to adversely react not necessarily to the active ingredients in their medications but rather to the excipients (fillers, binders, dyes, preservatives, etc.) in their medications. As such, if she experiences an adverse reaction very shortly after beginning an ordinarily well tolerated medication (as is certainly the case with the H1 and H2 blockers), excipient reactivity must be suspected and she should promptly work with her pharmacist to review the medication's ingredient list, try to identify the likely offending ingredient, and try one or more alternative formulations of the medication which don't share any of the offending formulation's excipients. She appears to understand.     It will be difficult (given that my schedule is now 100% booked for the next year) for me to see her back once the test results are back in a month or so, let alone to manage her through the \"tactical maneuvers\" of trying various medications to gain better control over the disease.  She understands it will be important for her to take time to try the various available non-sedating H1 blockers and H2 blockers.  We will have her return in about three months to see the senior " "physician's assistant here, Brie Rodríguezt, who now is kindly assisting me with my follow-up patients. Ms. Obrien will work through the above-noted tactical maneuvers with the patient, and I'll see her roughly every 6-12 months for \"strategic reassessments.\"     As is usually the case with initial consultations for mast cell disease, this was a long encounter, 120 minutes in total, with 70 minutes spent in counseling. The patient indicated that all of her questions at this time had been answered to her satisfaction, and she expressed appreciation for the time I had given her.      Typed, reviewed, and electronically signed by: Keegan Willson M.D.     DT: 06/01/17 01:19 AM    cc: 1. Jessica Lyons M.D. ([facility], [address], [place] [address], [phone], fax [phone])  2. Please also mail a copy to the patient at her home address as listed in the system.      Addendum (7/16/17): Labs are back and clearly show we're headed in the right diagnostic direction, but we're still not quite at the endpoint I'd like to see. Basically, all the tests were 100% normal/unrevealing (including Lyme antibodies) except for some intriguing biopsy findings (more in a moment) and mild macrocytosis () and a plasma prostaglandin D2 level that was right at the very top end of the normal range (115 pg/ml, normal ).  Furthermore, both the random and 24-hour urinary prostaglandin D2 levels were far below the lower limit of that test's normal range, suggesting there may have been specimen mishandling (i.e., loss of thermal integrity) at some point between the point of collection and the point of processing at the reference lab in [facility] -- and therefore it's also possible that the \"normal\" plasma prostaglandin D2 result might have been a falsely low reading.  I note the serum chromogranin A, at 89 ng/ml (normal 0-95), and 24-hour urinary N-methylhistamine, at 180 " mcg/g Cr (normal ), were pretty close to those tests' upper limits of normal, too.    With regard to the biopsies, a duodenal biopsy from 4/11 was originally read as normal but on restaining with  showed up to 20-25 mast cells per high power field (hpf), slightly above the threshold (20/hpf) which in my experience most pathologists feel (usually based on the Car et al., Arch Pathol Lab Med 2006 paper) is the upper limit of normal for this parameter.  Furthermore, a biopsy from the second part of the duodenum in 4/12 showed 35-40/hpf on  staining, and another biopsy in 4/14 from the same site showed 25-30/hpf.     Thus, we have 2-3 biopsies which show more mast cells than usually thought normal, but we do not yet have a single elevated mast cell  level absolutely demonstrating mast cell activation, so I'm reluctant to make a definitive diagnostic declaration of MCAS.  The plasma prostaglandin D2 level, chromogranin A level, and 24-hour urinary N-methylhistamine level were provocative but, technically, normal.    I think the dilemma at this point is whether, out of economic interests, only the plasma prostaglandin D2 and serum chromogranin A and 24-hour urinary N-methylhistamine should be repeated (implying, of course, that if they come back negative, the entire suite of mast cell  tests will then need to be repeated) or -- since mast cell mediators do tend to fluctuate a good bit from day to day, even hour to hour -- out of interests in diagnostic expediency we should just go ahead at this time and repeat the entire mast cell  testing suite.     I'll ask my clinic nurse coordinator, Nighat Mckinney RN, to contact the patient to discuss her preference as to how to proceed.  She would need to again take care to avoid all NSAID (including salicylates; review of the information about salicylate-containing foods and household and personal care products at  http://www.salicylatesensitivity.com may be helpful) and PPI use for 5+ days prior to specimen collection.  It would be best (not strictly necessary, but nevertheless optimal) to submit the next round of specimens on a particularly symptomatic day.  It will be important for her to again attend rigorously to continuous chilling of the 24-hour urine collection.    I'm departing [facility] at the end of this month.  If results arrive before my departure, I'll provide another addendum with interpretation of results and recommendations regarding next steps, but if results arrive after my departure, the patient will need to provide them to me as a single complete package and then I'll provide an interpretation of results and recommendations regarding next steps.  All of my patients will be informed of my future contact information prior to my departure.    I spent another 20 minutes preparing this addendum, but as this was not face-to-face time, I did not submit any additional billing.      Typed, reviewed, and electronically signed by: Keegan Willsno M.D.     DT: 07/16/17 03:24 PM    cc: 1. Jessica Lyons M.D. ([facility], [address], [place] [address], [phone], fax [phone])  2. Please also mail a copy to the patient at her home address as listed in the system.      Addendum (9/11/17): Regrettably, the limited  re-testing performed late last month has now all resulted and yielded all normal results.  I still think it would be helpful to find at least one elevated mast cell  level before making a definitive diagnostic declaration of MCAS and starting down the potentially long and potentially expensive MCAS therapeutic pathway.    I think the full set of mast cell mediators tested initially (chilled serum tryptase, chilled serum chromogranin A, chilled plasma prostaglandin D2, chilled plasma histamine, chilled plasma heparin, and chilled random and 24-hour urine  samples for prostaglandin D2, 2,3-dinor-11-beta-prostaglandin-F2-alpha, N-methylhistamine, and leukotriene E4) should be re-tested, but it might be best to suspend her vitamin D and multivitamin use for a week prior to testing.  It also might be best for her to consult the information at http://www.salicylatesensitivity.com to see if there are foods or household or personal care products she might be using which might be confounding the testing.  It would be best, too, to defer repeat specimen collection to a particularly symptomatic day.    Furthermore, since I have now left [facility], I don't think it would be appropriate for me to place the orders for the repeat testing, so the patient will need to work with her other providers to order these tests.  If any of her other physicians ever have any questions for me, they are welcome to contact me at [email or web address] or [phone].      Typed, reviewed, and electronically signed by: Keegan Willson M.D.     DT: 09/11/17 09:03 PM    cc: 1. Jessica Lyons M.D. ([facility], [address], [place] [address], [phone], fax [phone])  2. Please also mail a copy to the patient at her home address as listed in the system.    Keegan Willson MD

## 2017-05-31 NOTE — PROGRESS NOTES
RN met with pt today; introduced self and the care coordinator role.    Had pt complete authorization to discuss PHI form and completed learning assessment.  Gave pt written instructions for 24 hour urine testing.  Reviewed instructions including importance of keeping specimen refrigerator cold during the entire collection process and transportation re: several of the mediators being tested for do not last more than 1 or 2 minutes in blood or urine at room temperature.  Answered pt's questions about collection & transportation process.    Per Dr. Willson, pt has had GI biopsies.  Pt signed MERA to obtain slides/tissue blocks for staining with .

## 2017-05-31 NOTE — PROGRESS NOTES
Patient: [facility] (MRN [id number])   Encounter Date: May 31, 2017  Referring: Jessica Lyons M.D. ([facility],  [address], [place] [address], [phone], fax [phone])  Attending: Keegan Willson M.D.     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  At the beginning of the encounter, I reviewed all available chart data, consisting solely of about 50 pages of an outside genomic analysis whose validity immediately seemed questionable (details below), plus a modicum of lab test reports from [place] accessed via her phone, plus an extensive assortment of labs accessed via [facility], but there really were no prior useful clinical notes for me to review, so I just proceeded to take a full history from scratch.  Note the patient was recommended by Dr. Bernarda Zenker of [facility] in [place], [place] to come here for evaluation, but the patient asked that I send a copy of my note only to her primary provider, Dr. Lyons, and to the patient herself.  The history here is a bit complex, and it's probably best to just present it all chronologically, blending HPI and PMH as follows.  The patient's history of chronic multisystem polymorbidity of generally inflammatory theme begins with onset of chronic constipation in college or possibly even earlier; somewhat oddly, she says she can't remember anything that happened to her before that point.  She says that otherwise she was perfectly healthy while growing up and got  at 24, but then the marriage started souring and she started suffering a lot of stress from this in her late 20s.  At 31 she suffered her  "first significant health problem, a vertebral artery dissection causing TIAs, visual blurriness, anesthesia in the right hand, and mild dysphasia, though fortunately she gained complete neurologic recovery.  She finally  at 32, remarried at 33, and then at 34 had her first pregnancy, which went quite smoothly, ending with a scheduled .  Her second pregnancy came at 36, also went smoothly, and also ended with a scheduled .  An episode of abdominal pain at 37, though, led to an appendectomy, and then at 38 she suddenly started suffering chronic fatigue (attributed by her physicians to the demands of ) and a new left proximal dysphagia and left throat \"lump\" which were evaluated by an ENT physician and diagnosed as globus.  A swallow study was normal.  At 40, despite no apparent troubles tolerating any foods, an EGD done to further investigate her dysphagia found \"total atrophy\" (per the patient's recollection; again, I have no records) and celiac disease was diagnosed.  She switched to a gluten-free diet and felt better for two weeks, but then her chronic fatigue relapsed.  She says she tried several other diets (elimination, paleo, low-histamine), but none ever provided any clear benefit.  She then saw a celiac disease specialist at [facility] who confirmed this diagnosis but felt it did not explain her symptoms, so she was referred to Neurology at [facility].  She says an EMG was negative, so she was referred to [facility]'s chronic fatigue syndrome/fibromyalgia center, where at 41 she was diagnosed with fibromyalgia.  She says this is also when there emerged the mild macrocytosis which has persisted, stable and unexplained, ever since.  Hematology evaluation at [facility] when she was 44 failed to identify a cause.  No marrow examination was performed.  She says three female cousins who have problems similar to hers were all found to have some sort of an MTHFR mutation, and she was recommended to see " "Dr. Zenker for this.  She says Dr. Zenker prescribed multiple supplements to treat the patient's MTHFR mutation, but none of them seemed to help her feel any better.  She says Dr. 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  The patient says that while she was waiting for her appointment here, Dr. Zenker asked her to empirically try a full (H1 + H2) histamine receptor blockade.  She cannot remember specifically which drugs she tried, but she is pretty sure it was just one H1 blocker and one H2 blocker, and they did not discernibly help, so they were stopped.  She also knows her ferritin and vitamin D have recently been near the lower ends of those tests' normal ranges.  She also reports a number of other (typically episodic, waxing/waning) problems which have emerged in the last 3-5 years including headaches, right eye pain, right pupil enlargement, plugging of her ears, bags under her eyes, jaw pain, mental fog, diffusely migratory joint stiffness and pain, muscle pain and stiffness, palpitations, insomnia, frequent waking, swelling of the nose, coryza, anxiety, foot cramping, arms falling asleep, and various rashes about the abdomen and extremities, especially \"red dots\" and non-pruritic welts on her abdomen, sometimes also on the inner arms and thighs, plus other, rough itchy rashes on her arms in the last year.    On a full review of systems, although she denies any issues with flushing, sweats, visual anomalies, unexplained fluctuations in weight or appetite, easy bleeding or bruising, sinonasal congestion, post-nasal drip, irritation of the nose/mouth/throat, chest pain/discomfort, gastroesophageal reflux, vomiting, " "diarrhea, abdominal pain, urinary urgency, syncope, onychodystrophy, or joint hypermobility, she endorses a wide range of other, chronic/recurrent, episodic/intermittent and/or waxing/waning issues including subjective (rarely objective) fevers, feeling cold much of the time, fatigue (often to the point of exhaustion), malaise, headaches, diffusely migratory aching/pain, diffusely migratory pruritus, irritation of the eyes, plugging of the ears, occasional tinnitus, episodes of spontaneous epistaxis since about age 44, coryza, proximal dysphagia, dyspnea (on occasion she \"just can't catch a deep breath\") but no wheezing, palpitations, nausea, constipation, (usually post-prandial) abdominal bloating, diffusely migratory weakness, edema diffusely migratory about the eyes, nose, throat, and ears, diffusely migratory tingling/numbness (typically about the upper extremities), diffusely migratory occasional bilateral cervical adenitis (but no adenopathy), her first episode ever of presyncope that she can remember occurring just yesterday in the shower, cognitive dysfunction (particularly memory, concentration, and word-finding), hair loss, receding gums, diffusely migratory rashes (typically patchy macular erythema), hives, insomnia, frequent waking, depression (a number of medications were tried, but none ever appeared to help), and vigorous insect bite reactions.  She notes she is asymptomatic if she eats gluten-containing foods.  She thinks she may have an anal fissure.  Complete ROS o/w neg.    Family history is notable for a father with polymyalgia rheumatica, hyperlipidemia, hypertension, and glaucoma, a mother with celiac disease, a benign thyroid nodule which led to a thyroidectomy and thyroid replacement therapy, plus a chronic problem with an itchy rash on her back (just as the patient herself now endures), a brother with asthma, a sister with asthma and \"muscle deterioration,\" another brother with celiac " disease and asthma, and two healthy children. The patient denies any history of smoking, significant alcohol use (note she does tolerate alcohol), or illegal substance use.    She lists her current medications as vitamin D 4,000 units qd, a daily multivitamin, and trazodone 100 mg qd.  She lists her current allergies as GI upset to gluten, and yet, again, she tells me she can eat gluten-containing foods without any difficulty..    Exam finds a thin (but not cachectic) woman in no apparent distress, pleasant, cooperative, fully alert and oriented, easily independently ambulatory, presenting by herself.  She has many small moles, mostly about her bilateral upper extremities.  Vitals per chart, all unremarkable.  Key findings are her thin but comfortable general appearance, HEENT benign, no plethora/pallor/diaphoresis/jaundice/bleeding/bruising, perhaps a very subtle residual patchy macular lightly erythematous rash about the bilateral shins (plus a healing rash biopsy site on the right shin), neck reported as just a little bit stiff (chronically) in all axes and directions on range of motion testing, no JVD or thyromegaly or carotid bruits, no palpable adenopathy or tenderness at any of the usual node-bearing sites, clear lungs, regular heart with no adventitious sounds, abdomen benign, no peripheral edema, neuro grossly intact.  On a light scratch test on the upper back, moderately bright dermatographism (erythroderma only, no hives) quickly emerged and was fully sustained when last checked 10 minutes later.     Review of available lab data was notable for normal CMP, TSH, LDH, pyridoxal 5-phosphate, anti-thyroid peroxidase antibodies, DARRYL panel, vitamin E, anti-acetylcholine-receptor antibodies, anti-striated muscle antibodies, angiotensin converting enzyme, creatine kinase, cortisol, ESR, INR, CCP, IgA, RF, and CRP.  Folate was high.  There was a slight increase in carotene.  Celiac antibody tests at [facility] were  "negative.  Serum copper and zinc were occasionally slightly low.  The genomic analysis is riddled with \"red alerts,\" but when I started by looking at the SANDY-related SNPs, I saw that she was marked as having homozygosity for the risk allele for two of these SNPs, and yet when I researched these SNPs further on-line, it was immediately apparent that her genotype for each of these SNPs was actually normal, not high-risk, so both of the \"red alerts\" for possible SANDY deficiency on her report appeared incorrect to me, and I felt at that point that examination of the rest of the report would not be sufficiently productive to spend more time on the matter.    Lab reports on [facility] show a number of curiosities.  With regard to CBCs, in 3/05 and 4/05 she had a borderline anemia and mild macrocytosis (103-105) (but a normal RDW), and then suddenly in 9/05 her CBCDs are completely normal (MCVs 95-96), followed in 5/09 with a normal CBCD with an MCV of 87, then in '10-'11 mild anemia with low-normal MCVs and minimal thrombocytosis, and then since around '15 she's had a mostly normal H/H but an MCV of 102 (and, again, a very normal RDW).  An IgA tissue transglutaminase antibody in '11 was quite high, but multiple celiac serologies since '15 have all been completely negative.  GAEL negative.  Lipids unremarkable.  CMP and magnesium normal.  Whole blood histamine in 12/15 was normal.  Homocysteine normal.  Hematopathology review of the blood smear in '15 confirmed the \"slight macrocytosis\" but could not identify a cause.  The right foreleg rash biopsy in 5/17 was read as \"subacute spongiotic dermatitis with mild superficial perivascular lymphocytic inflammation admixed with a few extravasated red blood cells...the findings are relatively mild and not clearly diagnostic, but would be most supportive of some form of eczematous dermatitis or possibly the eczematous variant of pigmented purpuric dermatitis.\"    A/P: Kudos to " Dr. Zenker, because I think she's likely right in her suspicion that a mast cell activation disorder (MCAD) -- and far more likely the far more prevalent (if only recently recognized) type termed mast cell activation syndrome (MCAS) than the rare (but long recognized) type termed mastocytosis -- is at the root of most or all of the patient's chronic multisystem polymorbidity of generally inflammatory theme.  Beyond all the other diseases already ruled out by the great extent of testing she's undergone to date, I don't know of any other human disease that comes anywhere near as close as MCAS does to accounting, directly or indirectly, for the full range and chronicity of all the symptoms and findings here. Literally everything here is consistent with what JYOTIS can and often does do, from the arterial dissection to her specific phrasing of how she occasionally just can't catch a deep breath (*precisely* the phrasing I most commonly hear from MCAS patients reporting dyspnea) plus the specific phrasing of the skin biopsy report, which, though non-specific, precisely matches what I see most often in skin biopsies from MCAS patients.  (Of note, too, I'm not saying that any of her prior diagnoses are wrong (though I'm not entirely confident about the celiac disease diagnosis). It's just that each of them accounts for only one subset or another of all that's gone on in her, while MCAS can account for all of everything that's been going on in her.) All of that said, she needs objective laboratory evidence of improperly behaving mast cells, toward which end I ordered the range of testing I typically check in assessing for MCAS, namely, a CBCD, CMP, magnesium, PT/PTT, chilled serum tryptase and chromogranin A, chilled plasma prostaglandin D2 and histamine and heparin, chilled random urinary prostaglandin D2 and N-methylhistamine and leukotriene E4 and 2,3-dinor 11-beta-prostaglandin-F2-alpha, and chilled 24-hour urinary  "prostaglandin D2 and N-methylhistamine and leukotriene E4 and 2,3-dinor 11-beta-prostaglandin-F2-alpha. I also ordered an anti-IgE IgG and an anti-IgE receptor antibody, which won't be useful diagnostically but may influence certain therapeutic decisions down the road if MCAS is proven. We confirmed she has abstained from confounding use of proton pump inhibitors (PPIs) and non-steroidal anti-inflammatory drugs (NSAIDs) for the prior 5+ days. We provided her careful written and verbal instructions regarding the 24-hour urine collection including the importance of continuous chilling of the specimen throughout collection and transport.  She understands the various reasons why a single round of  testing might yield all negative results, and she understands that if this happens, it of course doesn't even begin to invalidate/refute/negate even a single element of her history (which strongly argues for a mast cell disorder). Her history is what it is, so if we get a negative round of testing, I'll simply recommend repeat testing (which she'll of course need to pursue locally), albeit with specimen collection at that point preferably deferred to a particularly symptomatic day. I also asked her to work with our clinic nurse coordinator, Nighat Mckinney RN, to try to arrange for her old GI tract biopsies and resection specimens to be reassessed (either locally or here) with  staining, as mast cells can't be seen with routine H&E staining and I've often seen \"normal\" or \"mildly chronically inflamed\" GI tract biopsies in MCAS patients \"light up\" on  staining revealing mast cell disease. If 2-3 rounds of blood and urine testing yields all negative results, and if restaining of any available old biopsies either can't be done for some reason or yields negative results, the \"backup plan\" will be to pursue a fresh set of bidirectional endoscopies to obtain biopsies throughout the luminal GI tract for " pathologic evaluation specifically (using  staining at a minimum) for increased (>20/hpf) numbers of mast cells (as often seen mildly-moderately in MCAS, though not to anywhere near the degree (or patterns) seen in mastocytosis).     Although I cautioned her that she doesn't have a definitive diagnosis of MCAS yet, we did engage in extended discussion today about general aspects of MCAS including its essential nature of chronic multisystem polymorbidity of a generally inflammatory theme, the availability of many therapies shown helpful in various MCAD/MCAS patients, the good likelihood of (eventually) finding therapy that will help her achieve the goal of feeling significantly better than the pre-treatment baseline the majority of the time, and the present frustrating absence of any biomarkers that can help predict which of the many available, potentially helpful therapies is most likely to benefit the individual patient. She understands that if we are able to secure a diagnosis of MCAS, she will be dependent on pursuing -- in patient, persistent, and methodical fashion, trying as hard as possible to make just one change at a time -- trials of the various therapies (which, again, lacking predictive biomarkers, we generally pursue in order of escalating cost). She understands that once all test results are available about a month from now, I will write an addendum to this note (to again be distributed to all of her physicians listed below) providing my interpretation of the results and recommendations for next steps.     I told her that the only therapy I am willing to empirically recommend in a patient with suspected, but not yet proven, MCAS is a full (H1 + H2) histamine receptor blockade (e.g., cetirizine 10 mg bid or fexofenadine  mg bid + famotidine 20 mg bid or ranitidine  mg bid). She understands that some patients find that alternative H1 or H2 blockers serve them better, some patients find  "that tid dosing serves them better than bid dosing, and some patients find that slightly higher dosages (e.g., 20-30 mg rather than 10 mg of cetirizine, or 150 mg rather than 75 mg of ranitidine) serve them better than lower dosages. She will need to \"experiment,\" taking 2-4 weeks with each non-sedating H1 blocker (loratadine, cetirizine, fexofenadine, and levocetirizine, each at standard OTC doses but taken bid) to identify which serves her best, and then do the same with (two or more, each in turn) of the OTC H2 blockers (famotidine, ranitidine, cimetidine, and nizatadine) to identify which H2 blocker serves here best.  I also cautioned her that MCAD/MCAS patients have quite a predilection to adversely react not necessarily to the active ingredients in their medications but rather to the excipients (fillers, binders, dyes, preservatives, etc.) in their medications. As such, if she experiences an adverse reaction very shortly after beginning an ordinarily well tolerated medication (as is certainly the case with the H1 and H2 blockers), excipient reactivity must be suspected and she should promptly work with her pharmacist to review the medication's ingredient list, try to identify the likely offending ingredient, and try one or more alternative formulations of the medication which don't share any of the offending formulation's excipients. She appears to understand.     It will be difficult (given that my schedule is now 100% booked for the next year) for me to see her back once the test results are back in a month or so, let alone to manage her through the \"tactical maneuvers\" of trying various medications to gain better control over the disease.  She understands it will be important for her to take time to try the various available non-sedating H1 blockers and H2 blockers.  We will have her return in about three months to see the senior physician's assistant here, Brie Obrien, who now is kindly assisting me " "with my follow-up patients. Ms. Obrien will work through the above-noted tactical maneuvers with the patient, and I'll see her roughly every 6-12 months for \"strategic reassessments.\"     As is usually the case with initial consultations for mast cell disease, this was a long encounter, 120 minutes in total, with 70 minutes spent in counseling. The patient indicated that all of her questions at this time had been answered to her satisfaction, and she expressed appreciation for the time I had given her.      Typed, reviewed, and electronically signed by: Keegan Willson M.D.     DT: 06/01/17 01:19 AM    cc: 1. Jessica Lyons M.D. ([facility], [address], [place] [address], [phone], fax [phone])  2. Please also mail a copy to the patient at her home address as listed in the system.      Addendum (7/16/17): Labs are back and clearly show we're headed in the right diagnostic direction, but we're still not quite at the endpoint I'd like to see. Basically, all the tests were 100% normal/unrevealing (including Lyme antibodies) except for some intriguing biopsy findings (more in a moment) and mild macrocytosis () and a plasma prostaglandin D2 level that was right at the very top end of the normal range (115 pg/ml, normal ).  Furthermore, both the random and 24-hour urinary prostaglandin D2 levels were far below the lower limit of that test's normal range, suggesting there may have been specimen mishandling (i.e., loss of thermal integrity) at some point between the point of collection and the point of processing at the reference lab in [facility] -- and therefore it's also possible that the \"normal\" plasma prostaglandin D2 result might have been a falsely low reading.  I note the serum chromogranin A, at 89 ng/ml (normal 0-95), and 24-hour urinary N-methylhistamine, at 180 mcg/g Cr (normal ), were pretty close to those tests' upper limits of " normal, too.    With regard to the biopsies, a duodenal biopsy from 4/11 was originally read as normal but on restaining with  showed up to 20-25 mast cells per high power field (hpf), slightly above the threshold (20/hpf) which in my experience most pathologists feel (usually based on the Car et al., Arch Pathol Lab Med 2006 paper) is the upper limit of normal for this parameter.  Furthermore, a biopsy from the second part of the duodenum in 4/12 showed 35-40/hpf on  staining, and another biopsy in 4/14 from the same site showed 25-30/hpf.     Thus, we have 2-3 biopsies which show more mast cells than usually thought normal, but we do not yet have a single elevated mast cell  level absolutely demonstrating mast cell activation, so I'm reluctant to make a definitive diagnostic declaration of MCAS.  The plasma prostaglandin D2 level, chromogranin A level, and 24-hour urinary N-methylhistamine level were provocative but, technically, normal.    I think the dilemma at this point is whether, out of economic interests, only the plasma prostaglandin D2 and serum chromogranin A and 24-hour urinary N-methylhistamine should be repeated (implying, of course, that if they come back negative, the entire suite of mast cell  tests will then need to be repeated) or -- since mast cell mediators do tend to fluctuate a good bit from day to day, even hour to hour -- out of interests in diagnostic expediency we should just go ahead at this time and repeat the entire mast cell  testing suite.     I'll ask my clinic nurse coordinator, Nighat Mckinney RN, to contact the patient to discuss her preference as to how to proceed.  She would need to again take care to avoid all NSAID (including salicylates; review of the information about salicylate-containing foods and household and personal care products at http://www.salicylatesensitivity.com may be helpful) and PPI use for 5+ days prior to  specimen collection.  It would be best (not strictly necessary, but nevertheless optimal) to submit the next round of specimens on a particularly symptomatic day.  It will be important for her to again attend rigorously to continuous chilling of the 24-hour urine collection.    I'm departing [facility] at the end of this month.  If results arrive before my departure, I'll provide another addendum with interpretation of results and recommendations regarding next steps, but if results arrive after my departure, the patient will need to provide them to me as a single complete package and then I'll provide an interpretation of results and recommendations regarding next steps.  All of my patients will be informed of my future contact information prior to my departure.    I spent another 20 minutes preparing this addendum, but as this was not face-to-face time, I did not submit any additional billing.      Typed, reviewed, and electronically signed by: Keegan Willson M.D.     DT: 07/16/17 03:24 PM    cc: 1. Jessica Lyons M.D. ([facility], [address], [place] [address], [phone], fax [phone])  2. Please also mail a copy to the patient at her home address as listed in the system.      Addendum (9/11/17): Regrettably, the limited  re-testing performed late last month has now all resulted and yielded all normal results.  I still think it would be helpful to find at least one elevated mast cell  level before making a definitive diagnostic declaration of MCAS and starting down the potentially long and potentially expensive MCAS therapeutic pathway.    I think the full set of mast cell mediators tested initially (chilled serum tryptase, chilled serum chromogranin A, chilled plasma prostaglandin D2, chilled plasma histamine, chilled plasma heparin, and chilled random and 24-hour urine samples for prostaglandin D2, 2,3-dinor-11-beta-prostaglandin-F2-alpha,  N-methylhistamine, and leukotriene E4) should be re-tested, but it might be best to suspend her vitamin D and multivitamin use for a week prior to testing.  It also might be best for her to consult the information at http://www.salicylatesensitivity.com to see if there are foods or household or personal care products she might be using which might be confounding the testing.  It would be best, too, to defer repeat specimen collection to a particularly symptomatic day.    Furthermore, since I have now left [facility], I don't think it would be appropriate for me to place the orders for the repeat testing, so the patient will need to work with her other providers to order these tests.  If any of her other physicians ever have any questions for me, they are welcome to contact me at [email or web address] or [phone].      Typed, reviewed, and electronically signed by: Keegan Willson M.D.     DT: 09/11/17 09:03 PM    cc: 1. Jessica Lyons M.D. ([facility], [address], [place] [address], [phone], fax [phone])  2. Please also mail a copy to the patient at her home address as listed in the system.

## 2017-05-31 NOTE — NURSING NOTE
"Oncology Rooming Note    May 31, 2017 2:14 PM   Kenton Mathew is a 46 year old female who presents for:    Chief Complaint   Patient presents with     Oncology Clinic Visit     Mast Cell- New Patient     Initial Vitals: /67 (BP Location: Left arm, Patient Position: Chair, Cuff Size: Adult Regular)  Pulse 87  Temp 98.5  F (36.9  C) (Oral)  Resp 16  Ht 1.651 m (5' 5\")  Wt 51.6 kg (113 lb 12.8 oz)  LMP 05/20/2017  SpO2 96%  BMI 18.94 kg/m2 Estimated body mass index is 18.94 kg/(m^2) as calculated from the following:    Height as of this encounter: 1.651 m (5' 5\").    Weight as of this encounter: 51.6 kg (113 lb 12.8 oz). Body surface area is 1.54 meters squared.  No Pain (0) Comment: Data Unavailable   Patient's last menstrual period was 05/20/2017.  Allergies reviewed: Yes  Medications reviewed: Yes    Medications: Medication refills not needed today.  Pharmacy name entered into [employer]: [id number] - [place], [place] - [address] [place] [address] AVE AT [address]    Clinical concerns: no clinical concerns  provider was notified.    7 minutes for nursing intake (face to face time)     Tri Whitley CMA              "

## 2017-06-01 ENCOUNTER — CARE COORDINATION (OUTPATIENT)
Dept: ONCOLOGY | Facility: CLINIC | Age: 47
End: 2017-06-01

## 2017-06-01 NOTE — PROGRESS NOTES
Spoke with pt on home number after leaving message on her cell.  Discussed author forgot to ask her about where and when she had GI procedures.  [facility] 4/26/2011 endo with biopsy.    [facility] endo with biopsy on 4/26/2012 and again on 4/24/2014.    Also discussed how to proceed with trials of H1 antihistamines including differences in formulations from brand to generic brands of an H1 and logging her tolerance/response to each H1 that she tries to help identify best one for her.  Reviewed long acting OTC's are (by brand name) Allegra, Claritin, Zyrtec and Xyzal.    Pt appreciated assistance.

## 2017-06-02 ENCOUNTER — TELEPHONE (OUTPATIENT)
Dept: ONCOLOGY | Facility: CLINIC | Age: 47
End: 2017-06-02

## 2017-06-02 DIAGNOSIS — R04.0 EPISTAXIS: ICD-10-CM

## 2017-06-02 DIAGNOSIS — M79.7 FIBROMYALGIA: ICD-10-CM

## 2017-06-02 DIAGNOSIS — R53.82 CHRONIC FATIGUE: ICD-10-CM

## 2017-06-02 LAB
COLLECT DURATION TIME UR: 24 H
SPECIMEN VOL UR: 2030 ML

## 2017-06-02 NOTE — TELEPHONE ENCOUNTER
Social Work received referral from oncology distress screening to connect with patient regarding concerns about resources (home and work life issues). Patient was seen by Dr. Willson for mast cell activation syndrome work up, does not appear to have an oncology diagnosis.  SW attempted to call patient over the phone. No response, voicemail left with SW contact information for patient to call back. SW will attempt again later. SW is available to assist with any further needs.    Soo Yeon Han, [facility]  [phone]

## 2017-06-03 LAB — LEUKOTRIENE E4 URINE: 38

## 2017-06-04 LAB — CGA SERPL-MCNC: 89 NG/ML

## 2017-06-05 LAB — HISTAMINE SERPL-SCNC: NORMAL NMOL/L

## 2017-06-06 LAB
2,3 11B PROSTAGLANDIN F2A UR: 3456
2,3 11B PROSTAGLANDIN F2A UR: 3907

## 2017-06-07 ENCOUNTER — TELEPHONE (OUTPATIENT)
Dept: ONCOLOGY | Facility: CLINIC | Age: 47
End: 2017-06-07

## 2017-06-07 LAB
CIU ASSOCIATED BASOPHIL ACTIVATION: 10
IGE RECEP AB COMMENT: NORMAL
LEUKOTRIENE E4 URINE: 54

## 2017-06-07 NOTE — TELEPHONE ENCOUNTER
Social Work attempted again to contact patient by phone regarding referral from oncology distress screening about resources.  Voicemail left with SW contact information for patient to call if further assistance is needed.     Soo Yeon Han, [facility]  [phone]

## 2017-06-08 LAB
COLLECT DURATION TIME UR: 24 H
COLLECT DURATION TIME UR: NORMAL H
CREAT UR-MCNC: 47 MG/DL
CREAT UR-MCNC: 65 MG/DL
ME-HISTAMINE/CREAT 24H UR: 150
ME-HISTAMINE/CREAT 24H UR: 180
SPECIMEN VOL 24H UR: 2030 L
SPECIMEN VOL 24H UR: NORMAL L
TRYPTASE SERPL-MCNC: 2.2 NG/ML

## 2017-06-09 ENCOUNTER — TELEPHONE (OUTPATIENT)
Dept: ONCOLOGY | Facility: CLINIC | Age: 47
End: 2017-06-09

## 2017-06-09 LAB
ANTI IGE ANTIBODY: NORMAL
COLLECT DURATION TIME UR: NORMAL H
PROSTAGLANDIN D2 24H UR-MRATE: NORMAL
PROSTAGLANDIN D2: 115
SPECIMEN VOL 24H UR: NORMAL L

## 2017-06-09 NOTE — TELEPHONE ENCOUNTER
2017    To: [facility] (Ph: [phone] and Fax: [phone])    Fr: Dr Willson, [facility] (Ph: [phone] and Fax: [phone])    Our mutual patient, Kenton Mathew (: 1970) had endoscopic procedures with biopsies collected at your facility on 11 (OE-).     We are requesting slides of re-cut, unstained, polyp and non-polyp biopsy tissues; we plan to stain these specimens with  to look for mast cells. Our pathologist would prefer 3 slides of unstained sections (on  sticky  or charged slides) per tissue block. These slides, as well as old slides, should be sent overnight (FedEx acct # [id number]) to:    Dr. Keegan Willson MD  [facility], [facility] [address]    [place] [address]  Attn: Nighat Mckinney RN    If you have any questions, please feel free to call our nursing line at [phone].    Thank you for your assistance.    Keegan Willson M.D.  Associate Professor of Medicine  Division of Hematology, Oncology & Transplantation  [facility]  E-mail:  [email or web address]  Office phone:  [phone]  Digital pager:  [phone]  ===========================================================================================================

## 2017-06-09 NOTE — TELEPHONE ENCOUNTER
2017    To: [facility] (Ph: [phone] and Fax: [phone])    Fr: Dr Willson, [facility] (Ph: [phone] and Fax: [phone])    Our mutual patient, Kenton Mathew (: 1970) had endoscopic procedures with biopsies collected at your facility in 2012 and 2014.     We are requesting slides of re-cut, unstained, polyp and non-polyp biopsy tissues; we plan to stain these specimens with  to look for mast cells. Our pathologist would prefer 3 slides of unstained sections (on  sticky  or charged slides) per tissue block. These slides, as well as old slides, should be sent overnight (FedEx acct # [id number]) to:    Dr. Keegan Willson MD  [facility], [facility] [address]    [place] [address]  Attn: Nighat Mckinney RN    If you have any questions, please feel free to call our nursing line at [phone].    Thank you for your assistance.    Keegan Willson M.D.  Associate Professor of Medicine  Division of Hematology, Oncology & Transplantation  [facility]  E-mail:  [email or web address]  Office phone:  [phone]  Digital pager:  [phone]  ==============================================================================================================

## 2017-06-13 LAB
COLLECT DURATION TIME UR: 24 H
PROSTAGLANDIN D2 24H UR-MRATE: 59
SPECIMEN VOL 24H UR: 2030 L

## 2017-06-19 ENCOUNTER — DOCUMENTATION ONLY (OUTPATIENT)
Dept: ONCOLOGY | Facility: CLINIC | Age: 47
End: 2017-06-19

## 2017-06-19 NOTE — PROGRESS NOTES
Pathology Slides received from [facility]. Slides:4,  OE-. [facility] requests slides be returned to them. Slides delivered to pathology lab at [facility], [facility].      Pathology Slides received from [facility]. Slides:3,  [id number], Slides: 3; [id number]. [facility] requests slides be returned to them. Slides delivered to pathology lab at [facility], [facility].

## 2017-06-20 PROCEDURE — 88342 IMHCHEM/IMCYTCHM 1ST ANTB: CPT | Performed by: INTERNAL MEDICINE

## 2017-06-20 PROCEDURE — 00000346 ZZHCL STATISTIC REVIEW OUTSIDE SLIDES TC 88321: Performed by: INTERNAL MEDICINE

## 2017-06-23 LAB — COPATH REPORT: NORMAL

## 2017-07-01 ENCOUNTER — HEALTH MAINTENANCE LETTER (OUTPATIENT)
Age: 47
End: 2017-07-01

## 2017-07-07 LAB — COPATH REPORT: NORMAL

## 2017-08-07 ENCOUNTER — CARE COORDINATION (OUTPATIENT)
Dept: ONCOLOGY | Facility: CLINIC | Age: 47
End: 2017-08-07

## 2017-08-07 DIAGNOSIS — M79.7 FIBROMYALGIA: ICD-10-CM

## 2017-08-07 DIAGNOSIS — R53.82 CHRONIC FATIGUE: Primary | ICD-10-CM

## 2017-08-07 NOTE — PROGRESS NOTES
"Points of discussion for retesting of mast cell mediators:    --Spoke with pt reviewing that ideally, for diagnosis, Dr. Willson \"*most* prefers to find at least two elevated  levels, but if can't find, then prefers to find at least one elevated  level together with a positive biopsy (as compared to just positive biopsies alone).    --Dr. Willson's recommendation was to either repeat only the plasma prostaglandin D2 and serum chromogranin A and 24-hour urinary N-methylhistamine (if they come back negative then repeat the entire suite of mast cell  tests) or repeat entire suite at this time.  Pt choosing to repeat the 3 tests at this time.    Pt in process of walking to car and needs to travel some where.  She stated would call back to discuss further.  Author offered to leave voice mail with phone and a helpful web site to review to help reduce possible inadvertent exposure to salicylates: www.salicylatesensitivity.com.    Left message for pt as discussed.         "

## 2017-08-08 ENCOUNTER — CARE COORDINATION (OUTPATIENT)
Dept: ONCOLOGY | Facility: CLINIC | Age: 47
End: 2017-08-08

## 2017-08-08 NOTE — PROGRESS NOTES
"Per communication with Dr. Keegan Willson:    \"Correct -- in that I've not wanted it held before.  But there are vitamin D receptors on the surface of the mast cell, and when they're engaged by vitamin D, there is downregulation of mast cell production and release of assorted mediators including histamine and the precursor to leukotriene E4.     As such -- and especially in a patient who on a first round of  testing was \"so close\" to finding elevated levels -- I would have her stop the vitamin D and (why not?) the multivitamin, too, for a week prior to re-testing.\"    On 8/9, left message for pt stating that given circumstances, he would recommend stopping the Vitamin D and if taking a multivitamin, that too, for a week prior to repeat testing.  Stated know we did not finish our conversation yesterday but thought would leave this message incase she has questions about it.  Encouraged her to call back to finish discussion when she has opportunity.    "

## 2017-08-11 ENCOUNTER — CARE COORDINATION (OUTPATIENT)
Dept: ONCOLOGY | Facility: CLINIC | Age: 47
End: 2017-08-11

## 2017-08-11 NOTE — PROGRESS NOTES
Received message from pt today stating that she is going to be out of town next week so planning on restricting her exposure to NSAID's including food starting 8/21 and come in on 8/28 for lab testing.  Asked for clarification on if will have to provide a random urine with blood draw on 8/28.  Also stated would  24 hour urine supplies on 8/28.  Author called and left message stating sho missed her call.  Reviewed that she will be having 2 blood tests (not sure if in 1 or 2 tubes) on Monday and then the 24 hour urine test for N-methylhistamine.  Stated these were the tests that came back close to elevated previously.  Requested call back to discuss further re: time or appt and any other questions she may have.  Also mentioned Dr. Willson's recommendation for her to not take vitamin D or multivitamin during the week prior to testing and thru collection of 24 hour urine test.    8/14/16:  Since did not hear back from pt, scheduled lab appt for 8/28.  After receiving time of 8:00 am, called to leave message on pt's VM.  Instead spoke with pt.  She stated on 8/28 she needs lab appt to be 8:30 or after (kids returning to school).  She stated she would watch for time change via [employer].    Encouraged pt to call back with any other questions about repeat testing.  Message sent to scheduling to change lab appt to 8:30 or after.

## 2017-08-28 DIAGNOSIS — R53.82 CHRONIC FATIGUE: ICD-10-CM

## 2017-08-28 DIAGNOSIS — M79.7 FIBROMYALGIA: ICD-10-CM

## 2017-08-30 LAB — CGA SERPL-MCNC: 80 NG/ML (ref 0–95)

## 2017-08-31 DIAGNOSIS — R53.82 CHRONIC FATIGUE: ICD-10-CM

## 2017-08-31 DIAGNOSIS — M79.7 FIBROMYALGIA: ICD-10-CM

## 2017-09-05 LAB — PROSTAGLANDIN D2: 28 PG/ML

## 2017-09-06 LAB
COLLECT DURATION TIME UR: 24 H
CREAT UR-MCNC: 49 MG/DL
ME-HISTAMINE/CREAT 24H UR: 173 MCG/G CR (ref 30–200)
SPECIMEN VOL 24H UR: 1890 ML

## 2019-10-03 ENCOUNTER — HEALTH MAINTENANCE LETTER (OUTPATIENT)
Age: 49
End: 2019-10-03

## 2020-11-07 ENCOUNTER — HEALTH MAINTENANCE LETTER (OUTPATIENT)
Age: 50
End: 2020-11-07

## 2021-01-30 ENCOUNTER — HEALTH MAINTENANCE LETTER (OUTPATIENT)
Age: 51
End: 2021-01-30

## 2021-09-05 ENCOUNTER — HEALTH MAINTENANCE LETTER (OUTPATIENT)
Age: 51
End: 2021-09-05

## 2021-10-14 ENCOUNTER — TRANSFERRED RECORDS (OUTPATIENT)
Dept: MULTI SPECIALTY CLINIC | Facility: CLINIC | Age: 51
End: 2021-10-14

## 2021-10-14 LAB
HPV ABSTRACT: NORMAL
LDL CHOLESTEROL DIRECT (EXTERNAL): 107 MG/DL
PAP-ABSTRACT: NORMAL

## 2021-12-26 ENCOUNTER — HEALTH MAINTENANCE LETTER (OUTPATIENT)
Age: 51
End: 2021-12-26

## 2022-02-20 ENCOUNTER — HEALTH MAINTENANCE LETTER (OUTPATIENT)
Age: 52
End: 2022-02-20

## 2022-03-03 ENCOUNTER — TELEPHONE (OUTPATIENT)
Dept: OPHTHALMOLOGY | Facility: CLINIC | Age: 52
End: 2022-03-03

## 2022-03-03 ENCOUNTER — OFFICE VISIT (OUTPATIENT)
Dept: OPHTHALMOLOGY | Facility: CLINIC | Age: 52
End: 2022-03-03
Payer: COMMERCIAL

## 2022-03-03 DIAGNOSIS — H57.02 ANISOCORIA: ICD-10-CM

## 2022-03-03 DIAGNOSIS — H52.13 MYOPIA OF BOTH EYES: Primary | ICD-10-CM

## 2022-03-03 DIAGNOSIS — H53.10 SUBJECTIVE VISION DISTURBANCE, LEFT: ICD-10-CM

## 2022-03-03 PROCEDURE — 92310 CONTACT LENS FITTING OU: CPT | Performed by: OPTOMETRIST

## 2022-03-03 PROCEDURE — 92015 DETERMINE REFRACTIVE STATE: CPT | Performed by: OPTOMETRIST

## 2022-03-03 PROCEDURE — 92004 COMPRE OPH EXAM NEW PT 1/>: CPT | Performed by: OPTOMETRIST

## 2022-03-03 RX ORDER — MULTIVITAMIN WITH IRON
1 TABLET ORAL DAILY
COMMUNITY

## 2022-03-03 ASSESSMENT — CONF VISUAL FIELD
METHOD: COUNTING FINGERS
OD_NORMAL: 1
OS_NORMAL: 1

## 2022-03-03 ASSESSMENT — REFRACTION_MANIFEST
OS_AXIS: 145
OS_CYLINDER: +1.00
OD_CYLINDER: +0.75
OD_SPHERE: -6.50
OS_SPHERE: -6.75
OD_ADD: +1.75
OD_AXIS: 180
OS_ADD: +1.75

## 2022-03-03 ASSESSMENT — REFRACTION_CURRENTRX
OS_SPHERE: -6.00
OD_BASECURVE: 8.5
OD_CYLINDER: SPHERE
OS_AXIS: 050
OS_CYLINDER: -0.75
OD_BRAND: B&L ULTRA
OD_ADD: LO
OS_BRAND: J&J ACUVUE OASYS FOR ASTIGMATISM BC 8.6, D 14.5
OS_BRAND: B&L ULTRA
OS_ADD: HI
OD_SPHERE: -5.75
OD_BRAND: J&J ACUVUE OASYS BC 8.4, D 14.0
OS_SPHERE: -6.00
OS_BASECURVE: 8.6
OD_BASECURVE: 8.4
OD_DIAMETER: 14.2
OS_BASECURVE: 8.5
OS_DIAMETER: 14.2
OD_SPHERE: -6.00

## 2022-03-03 ASSESSMENT — TONOMETRY
IOP_METHOD: ICARE
OS_IOP_MMHG: 14
OD_IOP_MMHG: 13

## 2022-03-03 ASSESSMENT — SLIT LAMP EXAM - LIDS
COMMENTS: NORMAL
COMMENTS: NORMAL

## 2022-03-03 ASSESSMENT — VISUAL ACUITY
CORRECTION_TYPE: CONTACTS
OS_CC: 20/20
METHOD: SNELLEN - LINEAR
OD_CC+: -2
OD_CC: 20/20

## 2022-03-03 ASSESSMENT — EXTERNAL EXAM - RIGHT EYE: OD_EXAM: NORMAL

## 2022-03-03 ASSESSMENT — EXTERNAL EXAM - LEFT EYE: OS_EXAM: NORMAL

## 2022-03-03 ASSESSMENT — CUP TO DISC RATIO
OD_RATIO: 0.2
OS_RATIO: 0.3

## 2022-03-03 NOTE — PROGRESS NOTES
History  HPI     Annual Eye Exam     In both eyes.  Since onset it is gradually worsening.  Associated symptoms include eye pain (intermittent pain in her right eye) and flashes (left eye).  Negative for dryness, redness and tearing.  Pain was noted as 0/10 (None currently).              Comments     She states that her vision has seemed decreased in the past year.  She wears distance contacts and OTC readers over them for near vision.      In the past week she has started seeing with her left eye (tempor periphery) a drop of light that moves from top to bottom.  She has been aware of this three times int he past four days.    BLANQUITA Alfaro 2:28 PM  March 3, 2022             Last edited by Namrata Nj COT on 3/3/2022  2:34 PM. (History)          Assessment/Plan  (H52.13) Myopia of both eyes  (primary encounter diagnosis)  Comment: Myopia with presbyopia both eyes; right eye dominant, tolerated multifocal lenses well   Plan: REFRACTION, NE CONTACT LENS FITTING COSMETIC LVL 2 - ADULT         Educated patient on condition and clinical findings. Dispensed spectacle prescription for full time wear. Monitor annually..   Dispensed trial lenses and finalized contact lens prescription for 2 years. May reassess at follow-up if noting poor vision.    (H57.02) Anisocoria  Comment: Patient reports longstanding, noted today but not measured prior to dilation  Plan:  Return in 1 month for pupil recheck.    (H53.10) Subjective vision disturbance, left  Comment: Noting light drop in left periphery, no retinal findings  Plan:  No treatment indicated at this time. Monitor annually.    Return to clinic in 1 month for follow-up.    Complete documentation of historical and exam elements from today's encounter can  be found in the full encounter summary report (not reduplicated in this progress  note). I personally obtained the chief complaint(s) and history of present illness. I  confirmed and edited as necessary the review of  systems, past medical/surgical  history, family history, social history, and examination findings as documented by  others; and I examined the patient myself. I personally reviewed the relevant tests,  images, and reports as documented above. I formulated and edited as necessary the  assessment and plan and discussed the findings and management plan with the  patient and family.    Thomas Ray OD, FAAO

## 2022-03-03 NOTE — NURSING NOTE
Chief Complaints and History of Present Illnesses   Patient presents with     Annual Eye Exam     Chief Complaint(s) and History of Present Illness(es)     Annual Eye Exam     Laterality: both eyes    Course: gradually worsening    Associated symptoms: eye pain (intermittent pain in her right eye).  Negative for dryness, redness and tearing    Pain scale: 0/10 (None currently)              Comments     She states that her vision has seemed decreased in the past year.  She wears distance contacts and OTC readers over them for near vision.      Namrata Nj, COT 2:28 PM  March 3, 2022

## 2022-03-03 NOTE — TELEPHONE ENCOUNTER
LVM for patient regarding scheduling a Return in about 1 month (around 4/3/2022) for Follow Up.Provided direct number for scheduling needs.

## 2022-03-14 ENCOUNTER — TELEPHONE (OUTPATIENT)
Dept: OPHTHALMOLOGY | Facility: CLINIC | Age: 52
End: 2022-03-14
Payer: COMMERCIAL

## 2022-03-14 NOTE — TELEPHONE ENCOUNTER
Patient returned VM regarding scheduling a Return in about 1 month (around 4/3/2022) for Follow Up. Patient was able to schedule accordingly and declined appointment letter need. Patient is aware of time, date and location. -Per Patient

## 2022-03-25 ENCOUNTER — TRANSFERRED RECORDS (OUTPATIENT)
Dept: MULTI SPECIALTY CLINIC | Facility: CLINIC | Age: 52
End: 2022-03-25

## 2022-04-27 ENCOUNTER — OFFICE VISIT (OUTPATIENT)
Dept: OPHTHALMOLOGY | Facility: CLINIC | Age: 52
End: 2022-04-27
Payer: COMMERCIAL

## 2022-04-27 DIAGNOSIS — H57.02 ANISOCORIA: ICD-10-CM

## 2022-04-27 DIAGNOSIS — H52.13 MYOPIA OF BOTH EYES: Primary | ICD-10-CM

## 2022-04-27 PROCEDURE — 99207 PR NO CHARGE LOS: CPT | Performed by: OPTOMETRIST

## 2022-04-27 ASSESSMENT — VISUAL ACUITY
CORRECTION_TYPE: CONTACTS
OD_CC+: -1
METHOD: SNELLEN - LINEAR
OS_CC: 20/30
OD_CC: 20/15
OS_CC+: -1

## 2022-04-27 ASSESSMENT — CONF VISUAL FIELD
METHOD: COUNTING FINGERS
OS_NORMAL: 1
OD_NORMAL: 1

## 2022-04-27 ASSESSMENT — REFRACTION_CURRENTRX
OD_DIAMETER: 14.2
OS_BASECURVE: 8.5
OD_ADD: LOW
OS_BASECURVE: 8.5
OS_DIAMETER: 14.2
OD_DIAMETER: 14.2
OD_SPHERE: -6.00
OD_ADD: LOW
OD_BASECURVE: 8.5
OS_ADD: LOW
OD_BASECURVE: 8.5
OS_DIAMETER: 14.2
OS_ADD: HIGH
OD_SPHERE: -6.00
OS_SPHERE: -5.50
OS_SPHERE: -5.50

## 2022-04-27 ASSESSMENT — TONOMETRY
OD_IOP_MMHG: 15
IOP_METHOD: ICARE
OS_IOP_MMHG: 17

## 2022-04-27 ASSESSMENT — EXTERNAL EXAM - RIGHT EYE: OD_EXAM: NORMAL

## 2022-04-27 ASSESSMENT — SLIT LAMP EXAM - LIDS
COMMENTS: NORMAL
COMMENTS: NORMAL

## 2022-04-27 ASSESSMENT — EXTERNAL EXAM - LEFT EYE: OS_EXAM: NORMAL

## 2022-04-27 NOTE — PROGRESS NOTES
History  HPI     Follow Up     In both eyes.              Comments     Patient states following up regarding new contacts and to look at anisocoria. Near vision is wonderful, distance needs work. Patient states after 20-30 feet its blurry for distance. Patient states comfort and fit are good.     LOUISE Dillon April 27, 2022 2:19 PM            Last edited by Nova Anderson on 4/27/2022  2:19 PM. (History)          Assessment/Plan  (H52.13) Myopia of both eyes  (primary encounter diagnosis)  Comment: Myopia with presbyopia both eyes, noting distance blur in previous contact lens prescription; distance acuity is the priority  Plan:  Dispensed trial lenses and finalized contact lens prescription for 2 years. Explained that near vision may be slightly compromised, but the patient noted improved distance acuity.    (H57.02) Anisocoria  Comment: Not present on examination today; likely intermittent physiological anisocoria based on history  Plan:  No treatment indicated at this time. Monitor annually.    Return to clinic in 1 year for comprehensive eye exam.    This visit billed as no-charge contact lens follow-up.    Complete documentation of historical and exam elements from today's encounter can  be found in the full encounter summary report (not reduplicated in this progress  note). I personally obtained the chief complaint(s) and history of present illness. I  confirmed and edited as necessary the review of systems, past medical/surgical  history, family history, social history, and examination findings as documented by  others; and I examined the patient myself. I personally reviewed the relevant tests,  images, and reports as documented above. I formulated and edited as necessary the  assessment and plan and discussed the findings and management plan with the  patient and family.    Thomas Ray, REANNA, FAAO

## 2022-04-27 NOTE — NURSING NOTE
Chief Complaints and History of Present Illnesses   Patient presents with     Follow Up     Chief Complaint(s) and History of Present Illness(es)     Follow Up     Laterality: both eyes              Comments     Patient states following up regarding new contacts and to look at anisocoria. Near vision is wonderful, distance needs work. Patient states after 20-30 feet its blurry for distance. Patient states comfort and fit are good.     Jenifer Anderson, LOUISE April 27, 2022 2:19 PM

## 2022-05-27 ENCOUNTER — TELEPHONE (OUTPATIENT)
Dept: OPHTHALMOLOGY | Facility: CLINIC | Age: 52
End: 2022-05-27
Payer: COMMERCIAL

## 2022-08-05 ENCOUNTER — TELEPHONE (OUTPATIENT)
Dept: OPHTHALMOLOGY | Facility: CLINIC | Age: 52
End: 2022-08-05

## 2022-08-05 NOTE — TELEPHONE ENCOUNTER
[facility] Call Center    Phone Message    May a detailed message be left on voicemail: yes     Reason for Call: Symptoms or Concerns     If patient has red-flag symptoms, warm transfer to triage line    Current symptom or concern: Pt reports that over the last month her contacts having been irritating her eyes, where they feel scratchy on the lids and eyes have been watering. Pt is scheduled on 9/8 with Dr. Ray.    Symptoms have been present for:  1 month(s)    Has patient previously been seen for this? No    By : Dr. Ray    Date: 4/27/22    Are there any new or worsening symptoms? Yes: These are new symptoms.      Action Taken: Message routed to:  [facility] EYE    Travel Screening: Not Applicable

## 2022-08-08 NOTE — TELEPHONE ENCOUNTER
I called to speak to the patient regarding her contact lens discomfort. She is currently not wearing the lenses due to discomfort. She uses a [employer] brand generic contact lens solution.  I recommended that she try Pataday drops as the irritation could be secondary to seasonal allergies. If no improvement in comfort after several days of use, I recommended that she try ClearCare solution.  If no improvement with either intervention, I recommended follow-up (as scheduled) in one month.

## 2022-10-23 ENCOUNTER — HEALTH MAINTENANCE LETTER (OUTPATIENT)
Age: 52
End: 2022-10-23

## 2022-12-10 ENCOUNTER — HEALTH MAINTENANCE LETTER (OUTPATIENT)
Age: 52
End: 2022-12-10

## 2023-01-26 ENCOUNTER — APPOINTMENT (OUTPATIENT)
Dept: GENERAL RADIOLOGY | Facility: CLINIC | Age: 53
End: 2023-01-26
Attending: EMERGENCY MEDICINE
Payer: COMMERCIAL

## 2023-01-26 ENCOUNTER — HOSPITAL ENCOUNTER (EMERGENCY)
Facility: CLINIC | Age: 53
Discharge: LEFT BEFORE DISCHARGE | End: 2023-01-26
Admitting: EMERGENCY MEDICINE
Payer: COMMERCIAL

## 2023-01-26 VITALS
SYSTOLIC BLOOD PRESSURE: 116 MMHG | TEMPERATURE: 99.3 F | RESPIRATION RATE: 20 BRPM | OXYGEN SATURATION: 99 % | DIASTOLIC BLOOD PRESSURE: 54 MMHG | HEART RATE: 91 BPM

## 2023-01-26 LAB
ALBUMIN SERPL BCG-MCNC: 4.8 G/DL (ref 3.5–5.2)
ALP SERPL-CCNC: 87 U/L (ref 35–104)
ALT SERPL W P-5'-P-CCNC: 16 U/L (ref 10–35)
ANION GAP SERPL CALCULATED.3IONS-SCNC: 8 MMOL/L (ref 7–15)
AST SERPL W P-5'-P-CCNC: 25 U/L (ref 10–35)
ATRIAL RATE - MUSE: 89 BPM
BILIRUB SERPL-MCNC: 0.4 MG/DL
BUN SERPL-MCNC: 16.8 MG/DL (ref 6–20)
CALCIUM SERPL-MCNC: 9.6 MG/DL (ref 8.6–10)
CHLORIDE SERPL-SCNC: 104 MMOL/L (ref 98–107)
CREAT SERPL-MCNC: 0.63 MG/DL (ref 0.51–0.95)
DEPRECATED HCO3 PLAS-SCNC: 28 MMOL/L (ref 22–29)
DIASTOLIC BLOOD PRESSURE - MUSE: NORMAL MMHG
ERYTHROCYTE [DISTWIDTH] IN BLOOD BY AUTOMATED COUNT: 11.9 % (ref 10–15)
GFR SERPL CREATININE-BSD FRML MDRD: >90 ML/MIN/1.73M2
GLUCOSE SERPL-MCNC: 98 MG/DL (ref 70–99)
HCT VFR BLD AUTO: 42 % (ref 35–47)
HGB BLD-MCNC: 13.2 G/DL (ref 11.7–15.7)
HOLD SPECIMEN: NORMAL
INTERPRETATION ECG - MUSE: NORMAL
MCH RBC QN AUTO: 32.8 PG (ref 26.5–33)
MCHC RBC AUTO-ENTMCNC: 31.4 G/DL (ref 31.5–36.5)
MCV RBC AUTO: 104 FL (ref 78–100)
P AXIS - MUSE: 80 DEGREES
PLATELET # BLD AUTO: 334 10E3/UL (ref 150–450)
POTASSIUM SERPL-SCNC: 4.2 MMOL/L (ref 3.4–5.3)
PR INTERVAL - MUSE: 160 MS
PROT SERPL-MCNC: 7.3 G/DL (ref 6.4–8.3)
QRS DURATION - MUSE: 70 MS
QT - MUSE: 362 MS
QTC - MUSE: 440 MS
R AXIS - MUSE: 83 DEGREES
RBC # BLD AUTO: 4.03 10E6/UL (ref 3.8–5.2)
SODIUM SERPL-SCNC: 140 MMOL/L (ref 136–145)
SYSTOLIC BLOOD PRESSURE - MUSE: NORMAL MMHG
T AXIS - MUSE: 68 DEGREES
TROPONIN T SERPL HS-MCNC: 11 NG/L
VENTRICULAR RATE- MUSE: 89 BPM
WBC # BLD AUTO: 8.3 10E3/UL (ref 4–11)

## 2023-01-26 PROCEDURE — 85027 COMPLETE CBC AUTOMATED: CPT | Performed by: EMERGENCY MEDICINE

## 2023-01-26 PROCEDURE — 71046 X-RAY EXAM CHEST 2 VIEWS: CPT

## 2023-01-26 PROCEDURE — 80053 COMPREHEN METABOLIC PANEL: CPT | Performed by: EMERGENCY MEDICINE

## 2023-01-26 PROCEDURE — 36415 COLL VENOUS BLD VENIPUNCTURE: CPT | Performed by: EMERGENCY MEDICINE

## 2023-01-26 PROCEDURE — 84484 ASSAY OF TROPONIN QUANT: CPT | Performed by: EMERGENCY MEDICINE

## 2023-01-26 PROCEDURE — 999N000104 HC STATISTIC NO CHARGE

## 2023-01-26 NOTE — ED TRIAGE NOTES
Triage Assessment     Row Name 01/26/23 0851       Triage Assessment (Adult)    Airway WDL WDL       Respiratory WDL    Respiratory WDL WDL       Skin Circulation/Temperature WDL    Skin Circulation/Temperature WDL WDL       Cardiac WDL    Cardiac WDL X   middle to left sided chest pain that woke her up at 5am, pain radiated to her back       Peripheral/Neurovascular WDL    Peripheral Neurovascular WDL WDL       Cognitive/Neuro/Behavioral WDL    Cognitive/Neuro/Behavioral WDL WDL

## 2023-01-26 NOTE — ED TRIAGE NOTES
Woke up at 5am with chest pain radiating to her back. Took an antacid and baby aspirin. Denies SOB or n/v.     Triage Assessment     Row Name 01/26/23 0851       Triage Assessment (Adult)    Airway WDL WDL       Respiratory WDL    Respiratory WDL WDL       Skin Circulation/Temperature WDL    Skin Circulation/Temperature WDL WDL       Cardiac WDL    Cardiac WDL X   middle to left sided chest pain that woke her up at 5am, pain radiated to her back       Peripheral/Neurovascular WDL    Peripheral Neurovascular WDL WDL       Cognitive/Neuro/Behavioral WDL    Cognitive/Neuro/Behavioral WDL WDL

## 2023-05-03 ENCOUNTER — OFFICE VISIT (OUTPATIENT)
Dept: OPHTHALMOLOGY | Facility: CLINIC | Age: 53
End: 2023-05-03
Payer: COMMERCIAL

## 2023-05-03 DIAGNOSIS — H52.13 MYOPIA OF BOTH EYES: Primary | ICD-10-CM

## 2023-05-03 DIAGNOSIS — H57.02 PHYSIOLOGIC ANISOCORIA: ICD-10-CM

## 2023-05-03 DIAGNOSIS — H53.10 SUBJECTIVE VISION DISTURBANCE, BILATERAL: ICD-10-CM

## 2023-05-03 PROCEDURE — 92014 COMPRE OPH EXAM EST PT 1/>: CPT | Performed by: OPTOMETRIST

## 2023-05-03 PROCEDURE — 92015 DETERMINE REFRACTIVE STATE: CPT | Performed by: OPTOMETRIST

## 2023-05-03 ASSESSMENT — VISUAL ACUITY
OD_CC: 20/70
OS_CC: 20/40
METHOD: SNELLEN - LINEAR
OS_PH_CC: 20/25
CORRECTION_TYPE: CONTACTS
OS_CC: 20/20-
OD_CC: 20/20

## 2023-05-03 ASSESSMENT — CONF VISUAL FIELD
OD_SUPERIOR_NASAL_RESTRICTION: 0
OS_SUPERIOR_TEMPORAL_RESTRICTION: 0
OD_NORMAL: 1
OD_SUPERIOR_TEMPORAL_RESTRICTION: 0
OS_SUPERIOR_NASAL_RESTRICTION: 0
OD_INFERIOR_NASAL_RESTRICTION: 0
OS_INFERIOR_NASAL_RESTRICTION: 0
OS_INFERIOR_TEMPORAL_RESTRICTION: 0
OD_INFERIOR_TEMPORAL_RESTRICTION: 0
OS_NORMAL: 1

## 2023-05-03 ASSESSMENT — SLIT LAMP EXAM - LIDS
COMMENTS: NORMAL
COMMENTS: NORMAL

## 2023-05-03 ASSESSMENT — REFRACTION_MANIFEST
OS_SPHERE: -7.25
OD_ADD: +1.75
OS_AXIS: 145
OD_SPHERE: -6.50
OS_CYLINDER: +0.75
OD_CYLINDER: +0.25
OS_ADD: +1.75
OD_AXIS: 180

## 2023-05-03 ASSESSMENT — EXTERNAL EXAM - LEFT EYE: OS_EXAM: NORMAL

## 2023-05-03 ASSESSMENT — EXTERNAL EXAM - RIGHT EYE: OD_EXAM: NORMAL

## 2023-05-03 ASSESSMENT — CUP TO DISC RATIO
OS_RATIO: 0.3
OD_RATIO: 0.2

## 2023-05-03 ASSESSMENT — TONOMETRY
IOP_METHOD: ICARE
OD_IOP_MMHG: 18
OS_IOP_MMHG: 15

## 2023-05-03 NOTE — PROGRESS NOTES
"History  HPI     Annual Eye Exam    In both eyes.  Charactertized as  blurred vision.  Associated symptoms include Negative for dryness, eye pain, redness, flashes and floaters.  Treatments tried include glasses.  Response to treatment was issue resolved. Additional comments: The patient presents for comprehensive eye exam. Reports good vision and comfort with contact lenses. Cleans with Optifree, replaces monthly, does not sleep in lenses.  Reports longstanding \"drops of light\" in the peripheral vision, stable.          Last edited by Thomas Ray, OD on 5/3/2023 10:36 AM.          Assessment/Plan  (H52.13) Myopia of both eyes  (primary encounter diagnosis)  Comment: Myopia both eyes with presbyopia, happy with current contact lenses  Plan: REFRACTION         Educated patient on condition and clinical findings. Dispensed spectacle prescription for full time wear. Monitor annually.   Contact lens prescription valid for 1 more year.    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    (H53.10) Subjective vision disturbance, bilateral  Comment:  Noting light drop in peripheral vision, longstanding, decreasing in frequency  Plan:  No treatment indicated at this time. Monitor annually.    Return to clinic in 1 year for comprehensive eye exam.    Complete documentation of historical and exam elements from today's encounter can  be found in the full encounter summary report (not reduplicated in this progress  note). I personally obtained the chief complaint(s) and history of present illness. I  confirmed and edited as necessary the review of systems, past medical/surgical  history, family history, social history, and examination findings as documented by  others; and I examined the patient myself. I personally reviewed the relevant tests,  images, and reports as documented above. I formulated and edited as necessary the  assessment and plan and discussed the findings and " management plan with the  patient and family.    Thomas Ray OD, FAAO

## 2023-11-07 ENCOUNTER — OFFICE VISIT (OUTPATIENT)
Dept: FAMILY MEDICINE | Facility: CLINIC | Age: 53
End: 2023-11-07
Payer: COMMERCIAL

## 2023-11-07 VITALS
RESPIRATION RATE: 20 BRPM | TEMPERATURE: 97.8 F | HEIGHT: 65 IN | SYSTOLIC BLOOD PRESSURE: 116 MMHG | BODY MASS INDEX: 23.99 KG/M2 | OXYGEN SATURATION: 98 % | HEART RATE: 86 BPM | DIASTOLIC BLOOD PRESSURE: 70 MMHG | WEIGHT: 144 LBS

## 2023-11-07 DIAGNOSIS — R53.82 CHRONIC FATIGUE: Primary | ICD-10-CM

## 2023-11-07 DIAGNOSIS — E53.8 VITAMIN B12 DEFICIENCY (NON ANEMIC): ICD-10-CM

## 2023-11-07 DIAGNOSIS — M79.7 FIBROMYALGIA: ICD-10-CM

## 2023-11-07 DIAGNOSIS — D75.89 MACROCYTOSIS: ICD-10-CM

## 2023-11-07 DIAGNOSIS — E55.9 VITAMIN D DEFICIENCY: ICD-10-CM

## 2023-11-07 DIAGNOSIS — Z13.9 ENCOUNTER FOR SCREENING INVOLVING SOCIAL DETERMINANTS OF HEALTH (SDOH): ICD-10-CM

## 2023-11-07 DIAGNOSIS — Z12.31 VISIT FOR SCREENING MAMMOGRAM: ICD-10-CM

## 2023-11-07 PROBLEM — N60.01 BREAST CYST, RIGHT: Status: ACTIVE | Noted: 2018-05-09

## 2023-11-07 PROBLEM — D75.839 THROMBOCYTHEMIA: Status: ACTIVE | Noted: 2023-11-07

## 2023-11-07 PROBLEM — G47.09 OTHER INSOMNIA: Status: ACTIVE | Noted: 2023-02-23

## 2023-11-07 PROBLEM — L82.1 SK (SEBORRHEIC KERATOSIS): Status: ACTIVE | Noted: 2018-04-09

## 2023-11-07 PROBLEM — R00.2 PALPITATIONS: Status: ACTIVE | Noted: 2022-03-10

## 2023-11-07 PROBLEM — R79.9 ABNORMAL BLOOD CELL COUNT: Status: ACTIVE | Noted: 2023-11-07

## 2023-11-07 LAB
FERRITIN SERPL-MCNC: 105 NG/ML (ref 11–328)
FOLATE SERPL-MCNC: 28.1 NG/ML (ref 4.6–34.8)
IRON BINDING CAPACITY (ROCHE): 291 UG/DL (ref 240–430)
IRON SATN MFR SERPL: 34 % (ref 15–46)
IRON SERPL-MCNC: 99 UG/DL (ref 37–145)
VIT B12 SERPL-MCNC: 929 PG/ML (ref 232–1245)
VIT D+METAB SERPL-MCNC: 60 NG/ML (ref 20–50)

## 2023-11-07 PROCEDURE — 83921 ORGANIC ACID SINGLE QUANT: CPT | Performed by: PHYSICIAN ASSISTANT

## 2023-11-07 PROCEDURE — 91320 SARSCV2 VAC 30MCG TRS-SUC IM: CPT | Performed by: PHYSICIAN ASSISTANT

## 2023-11-07 PROCEDURE — 82746 ASSAY OF FOLIC ACID SERUM: CPT | Performed by: PHYSICIAN ASSISTANT

## 2023-11-07 PROCEDURE — 90686 IIV4 VACC NO PRSV 0.5 ML IM: CPT | Performed by: PHYSICIAN ASSISTANT

## 2023-11-07 PROCEDURE — 82728 ASSAY OF FERRITIN: CPT | Performed by: PHYSICIAN ASSISTANT

## 2023-11-07 PROCEDURE — 99214 OFFICE O/P EST MOD 30 MIN: CPT | Mod: 25 | Performed by: PHYSICIAN ASSISTANT

## 2023-11-07 PROCEDURE — 90480 ADMN SARSCOV2 VAC 1/ONLY CMP: CPT | Performed by: PHYSICIAN ASSISTANT

## 2023-11-07 PROCEDURE — 36415 COLL VENOUS BLD VENIPUNCTURE: CPT | Performed by: PHYSICIAN ASSISTANT

## 2023-11-07 PROCEDURE — 90471 IMMUNIZATION ADMIN: CPT | Performed by: PHYSICIAN ASSISTANT

## 2023-11-07 PROCEDURE — 82306 VITAMIN D 25 HYDROXY: CPT | Performed by: PHYSICIAN ASSISTANT

## 2023-11-07 PROCEDURE — 83540 ASSAY OF IRON: CPT | Performed by: PHYSICIAN ASSISTANT

## 2023-11-07 PROCEDURE — 82607 VITAMIN B-12: CPT | Performed by: PHYSICIAN ASSISTANT

## 2023-11-07 PROCEDURE — 83550 IRON BINDING TEST: CPT | Performed by: PHYSICIAN ASSISTANT

## 2023-11-07 RX ORDER — MULTIPLE VITAMINS W/ MINERALS TAB 9MG-400MCG
1 TAB ORAL DAILY
COMMUNITY

## 2023-11-07 ASSESSMENT — ENCOUNTER SYMPTOMS
NERVOUS/ANXIOUS: 1
FATIGUE: 1

## 2023-11-07 NOTE — COMMUNITY RESOURCES LIST (ENGLISH)
11/07/2023   [facility] iRx Reminder  N/A  For questions about this resource list or additional care needs, please contact your primary care clinic or care manager.  Phone: [phone]   Email: N/A   Address: [address] [place] [id number]   Hours: N/A        Financial Stability       Utility payment assistance  1  [place] [facility] - Utility payment assistance Distance: 1.94 miles      In-Person, Phone/Virtual   [address] [place] [address]  Language: English  Hours: Mon - Thu 9:00 AM - 3:00 PM  Fees: Free   Phone: (753) 183-6074 Email: [email or web address] Website: [email or web address]://[email or web address]/care-ministries/     2  [place] [facility] Distance: 2.83 miles      In-Person   [address] [place] [address]  Language: English  Hours: Mon - Fri 8:00 AM - 3:00 PM  Fees: Free   Phone: (522) 559-9537 Ext.14 Email: [email or web address].Clickst Website: http://www.[facility] HospitalPodaddiesHuntington Beach Hospital and Medical CenterpayeverCincinnati Shriners Hospital.org          Important Numbers & Websites       Emergency Services   911  City Services   311  Poison Control   (944) 881-1242  Suicide Prevention Lifeline   (725) 511-9595 (TALK)  Child Abuse Hotline   (649) 143-9605 (4-A-Child)  Sexual Assault Hotline   (226) 856-8214 (HOPE)  National Runaway Safeline   (636) 831-4663 (RUNAWAY)  All-Options Talkline   (914) 922-2877  Substance Abuse Referral   (764) 253-2750 (HELP)

## 2023-11-07 NOTE — COMMUNITY RESOURCES LIST (ENGLISH)
11/07/2023   [facility] anywayanyday  N/A  For questions about this resource list or additional care needs, please contact your primary care clinic or care manager.  Phone: [phone]   Email: N/A   Address: [address] [place] [address]   Hours: N/A        Financial Stability       Utility payment assistance  1  [facility] [employer] [facility] - Utility payment assistance Distance: 1.94 miles      In-Person, Phone/Virtual   [address] [place] [address]  Language: English  Hours: Mon - Thu 9:00 AM - 3:00 PM  Fees: Free   Phone: (644) 942-2726 Email: [email or web address] Website: [email or web address]://[email or web address]/care-ministries/     2  [facility] Distance: 2.83 miles      In-Person   [address] [place] [address]  Language: English  Hours: Mon - Fri 8:00 AM - 3:00 PM  Fees: Free   Phone: (119) 224-5186 Ext.14 Email: [email or web address] Website: http://www.[facility] CenterTopmallAdventist Health Simi ValleyVault DragonUniversity Hospitals Beachwood [facility] Center.org          Important Numbers & Websites       Emergency Services   911  City Services   311  Poison Control   (577) 735-9411  Suicide Prevention Lifeline   (100) 390-6320 (TALK)  Child Abuse Hotline   (155) 298-6312 (4-A-Child)  Sexual Assault Hotline   (915) 953-2221 (HOPE)  National Runaway Safeline   (915) 190-5090 (RUNAWAY)  All-Options Talkline   (380) 598-6353  Substance Abuse Referral   (735) 217-3149 (HELP)

## 2023-11-07 NOTE — PATIENT INSTRUCTIONS
[email or web address]    [email or web address]    [phone]      [facility]  [address] [place] [address]  [phone]      Look for you email from [facility] medical cannabis

## 2023-11-07 NOTE — PROGRESS NOTES
Assessment & Plan     45 minutes spent by me on the date of the encounter doing chart review, review of outside records, patient visit, and documentation    -ongoing chronic fatigue  -has had multiple labs evaluations, visits etc  -recommend visit with naturopath physician, she will look into this  -I will notify pt of results when available     Chronic fatigue    Macrocytosis    Fibromyalgia    Visit for screening mammogram    Vitamin D deficiency  - Vitamin D Deficiency; Future  - Folate; Future  - Vitamin B12; Future  - Methylmalonic Acid; Future  - Ferritin; Future  - Iron and iron binding capacity; Future  - Vitamin D Deficiency  - Folate  - Vitamin B12  - Methylmalonic Acid  - Ferritin  - Iron and iron binding capacity    Vitamin B12 deficiency (non anemic)  - Vitamin D Deficiency; Future  - Folate; Future  - Vitamin B12; Future  - Methylmalonic Acid; Future  - Ferritin; Future  - Iron and iron binding capacity; Future  - Vitamin D Deficiency  - Folate  - Vitamin B12  - Methylmalonic Acid  - Ferritin  - Iron and iron binding capacity    Encounter for screening involving social determinants of health (SDoH)    Luisa Garnett PA-C  [facility]    Donna Fung is a 53 year old, presenting for the following health issues:  RECHECK, Fatigue, and Anxiety      11/7/2023     9:41 AM   Additional Questions   Roomed by Tessa       Fatigue  Associated symptoms include fatigue.   Anxiety  Associated symptoms include fatigue.   History of Present Illness       Reason for visit:  Joint pain and fatigue    She eats 2-3 servings of fruits and vegetables daily.She consumes 0 sweetened beverage(s) daily.She exercises with enough effort to increase her heart rate 9 or less minutes per day.  She exercises with enough effort to increase her heart rate 3 or less days per week.   She is taking medications regularly.       Review of Systems   Constitutional:  Positive for fatigue.  "  Psychiatric/Behavioral:  The patient is nervous/anxious.       Constitutional, HEENT, cardiovascular, pulmonary, gi and gu systems are negative, except as otherwise noted.      Objective    /70 (BP Location: Right arm, Patient Position: Sitting, Cuff Size: Adult Regular)   Pulse 86   Temp 97.8  F (36.6  C) (Temporal)   Resp 20   Ht 1.655 m (5' 5.16\")   Wt 65.3 kg (144 lb)   LMP 10/01/2022 (Approximate)   SpO2 98%   BMI 23.85 kg/m    Body mass index is 23.85 kg/m .  Physical Exam   GENERAL: healthy, alert and no distress  NECK: no adenopathy, no asymmetry, masses, or scars and thyroid normal to palpation  RESP: lungs clear to auscultation - no rales, rhonchi or wheezes  CV: regular rate and rhythm, normal S1 S2, no S3 or S4, no murmur, click or rub, no peripheral edema and peripheral pulses strong  ABDOMEN: soft, nontender, no hepatosplenomegaly, no masses and bowel sounds normal  MS: no gross musculoskeletal defects noted, no edema    Results for orders placed or performed in visit on 11/07/23   Vitamin D Deficiency     Status: Abnormal   Result Value Ref Range    Vitamin D, Total (25-Hydroxy) 60 (H) 20 - 50 ng/mL    Narrative    Season, race, dietary intake, and treatment affect the concentration of 25-hydroxy-Vitamin D. Values may decrease during winter months and increase during summer months.    Vitamin D determination is routinely performed by an immunoassay specific for 25 hydroxyvitamin D3.  If an individual is on vitamin D2(ergocalciferol) supplementation, please specify 25 OH vitamin D2 and D3 level determination by LCMSMS test VITD23.     Folate     Status: Normal   Result Value Ref Range    Folic Acid 28.1 4.6 - 34.8 ng/mL   Vitamin B12     Status: Normal   Result Value Ref Range    Vitamin B12 929 232 - 1,245 pg/mL   Methylmalonic Acid     Status: Normal   Result Value Ref Range    Methylmalonic Acid 0.14 0.00 - 0.40 umol/L    Narrative    This test was developed and its performance " characteristics determined by the [facility],  Special Chemistry Laboratory. It has not been cleared or approved by the FDA. The laboratory is regulated under CLIA as qualified to perform high-complexity testing. This test is used for clinical purposes. It should not be regarded as investigational or for research.   Ferritin     Status: Normal   Result Value Ref Range    Ferritin 105 11 - 328 ng/mL   Iron and iron binding capacity     Status: Normal   Result Value Ref Range    Iron 99 37 - 145 ug/dL    Iron Binding Capacity 291 240 - 430 ug/dL    Iron Sat Index 34 15 - 46 %             Prior to immunization administration, verified patients identity using patient s name and date of birth. Please see Immunization Activity for additional information.     Screening Questionnaire for Adult Immunization    Are you sick today?   No   Do you have allergies to medications, food, a vaccine component or latex?   No   Have you ever had a serious reaction after receiving a vaccination?   No   Do you have a long-term health problem with heart, lung, kidney, or metabolic disease (e.g., diabetes), asthma, a blood disorder, no spleen, complement component deficiency, a cochlear implant, or a spinal fluid leak?  Are you on long-term aspirin therapy?   No   Do you have cancer, leukemia, HIV/AIDS, or any other immune system problem?   No   Do you have a parent, brother, or sister with an immune system problem?   No   In the past 3 months, have you taken medications that affect  your immune system, such as prednisone, other steroids, or anticancer drugs; drugs for the treatment of rheumatoid arthritis, Crohn s disease, or psoriasis; or have you had radiation treatments?   No   Have you had a seizure, or a brain or other nervous system problem?   No   During the past year, have you received a transfusion of blood or blood    products, or been given immune (gamma) globulin or antiviral drug?   No   For women:  Are you pregnant or is there a chance you could become       pregnant during the next month?   No   Have you received any vaccinations in the past 4 weeks?   No     Immunization questionnaire answers were all negative.      Patient instructed to remain in clinic for 15 minutes afterwards, and to report any adverse reactions.     Screening performed by Tessa Maldonado RN on 11/7/2023 at 9:47 AM.

## 2023-11-08 ENCOUNTER — MYC MEDICAL ADVICE (OUTPATIENT)
Dept: FAMILY MEDICINE | Facility: CLINIC | Age: 53
End: 2023-11-08
Payer: COMMERCIAL

## 2023-11-08 DIAGNOSIS — R53.82 CHRONIC FATIGUE: ICD-10-CM

## 2023-11-08 DIAGNOSIS — R79.9 ABNORMAL BLOOD CELL COUNT: Primary | ICD-10-CM

## 2023-11-08 LAB — METHYLMALONATE SERPL-SCNC: 0.14 UMOL/L (ref 0–0.4)

## 2023-11-29 ENCOUNTER — DOCUMENTATION ONLY (OUTPATIENT)
Dept: FAMILY MEDICINE | Facility: CLINIC | Age: 53
End: 2023-11-29
Payer: COMMERCIAL

## 2023-11-29 NOTE — PROGRESS NOTES
Kenton DALY Martinezluis has an upcoming lab appointment and is eligible to have due Health Maintenance labs drawn per Health Maintenance Protocol Orders (HMPO) process.    Before it can be drawn, a diagnosis is needed for the following lab: : Lipid Panel    Please review and enter diagnosis as appropriate.     Jacki Barrera

## 2023-11-30 ENCOUNTER — LAB (OUTPATIENT)
Dept: LAB | Facility: CLINIC | Age: 53
End: 2023-11-30
Payer: COMMERCIAL

## 2023-11-30 DIAGNOSIS — R53.82 CHRONIC FATIGUE: ICD-10-CM

## 2023-11-30 DIAGNOSIS — R79.9 ABNORMAL BLOOD CELL COUNT: ICD-10-CM

## 2023-11-30 LAB
CA-I BLD-MCNC: 4.7 MG/DL (ref 4.4–5.2)
ERYTHROCYTE [DISTWIDTH] IN BLOOD BY AUTOMATED COUNT: 11.7 % (ref 10–15)
HCT VFR BLD AUTO: 42.6 % (ref 35–47)
HGB BLD-MCNC: 13.3 G/DL (ref 11.7–15.7)
MCH RBC QN AUTO: 32.4 PG (ref 26.5–33)
MCHC RBC AUTO-ENTMCNC: 31.2 G/DL (ref 31.5–36.5)
MCV RBC AUTO: 104 FL (ref 78–100)
PLATELET # BLD AUTO: 372 10E3/UL (ref 150–450)
RBC # BLD AUTO: 4.11 10E6/UL (ref 3.8–5.2)
WBC # BLD AUTO: 6.7 10E3/UL (ref 4–11)

## 2023-11-30 PROCEDURE — 85027 COMPLETE CBC AUTOMATED: CPT

## 2023-11-30 PROCEDURE — 84443 ASSAY THYROID STIM HORMONE: CPT

## 2023-11-30 PROCEDURE — 82306 VITAMIN D 25 HYDROXY: CPT

## 2023-11-30 PROCEDURE — 82330 ASSAY OF CALCIUM: CPT

## 2023-11-30 PROCEDURE — 36415 COLL VENOUS BLD VENIPUNCTURE: CPT

## 2023-12-01 LAB
TSH SERPL DL<=0.005 MIU/L-ACNC: 1.86 UIU/ML (ref 0.3–4.2)
VIT D+METAB SERPL-MCNC: 46 NG/ML (ref 20–50)

## 2023-12-06 ENCOUNTER — MYC MEDICAL ADVICE (OUTPATIENT)
Dept: FAMILY MEDICINE | Facility: CLINIC | Age: 53
End: 2023-12-06
Payer: COMMERCIAL

## 2023-12-06 DIAGNOSIS — R53.83 OTHER FATIGUE: Primary | ICD-10-CM

## 2023-12-21 ENCOUNTER — LAB (OUTPATIENT)
Dept: LAB | Facility: CLINIC | Age: 53
End: 2023-12-21
Payer: COMMERCIAL

## 2023-12-21 DIAGNOSIS — R53.83 OTHER FATIGUE: ICD-10-CM

## 2023-12-21 DIAGNOSIS — Z11.59 NEED FOR HEPATITIS C SCREENING TEST: ICD-10-CM

## 2023-12-21 DIAGNOSIS — Z11.4 SCREENING FOR HIV (HUMAN IMMUNODEFICIENCY VIRUS): Primary | ICD-10-CM

## 2023-12-21 LAB — B BURGDOR IGG+IGM SER QL: 0.05

## 2023-12-21 PROCEDURE — 36415 COLL VENOUS BLD VENIPUNCTURE: CPT

## 2023-12-21 PROCEDURE — 86618 LYME DISEASE ANTIBODY: CPT

## 2023-12-21 PROCEDURE — 87389 HIV-1 AG W/HIV-1&-2 AB AG IA: CPT

## 2023-12-21 PROCEDURE — 86803 HEPATITIS C AB TEST: CPT

## 2023-12-22 LAB
HCV AB SERPL QL IA: NONREACTIVE
HIV 1+2 AB+HIV1 P24 AG SERPL QL IA: NONREACTIVE

## 2024-01-20 ENCOUNTER — HEALTH MAINTENANCE LETTER (OUTPATIENT)
Age: 54
End: 2024-01-20

## 2024-01-22 ENCOUNTER — MYC MEDICAL ADVICE (OUTPATIENT)
Dept: FAMILY MEDICINE | Facility: CLINIC | Age: 54
End: 2024-01-22
Payer: COMMERCIAL

## 2024-01-26 NOTE — TELEPHONE ENCOUNTER
Please abstract the following data from this visit with this patient into the appropriate field in Epic:    Tests that can be patient reported without a hard copy:    Colonoscopy done on this date: 2022 (approximately), by this group: [facility], results were normal.  Repeat in 2032.     Other Tests found in the patient's chart through Chart Review/Care Everywhere:      Note to Abstraction: If this section is blank, no results were found via Chart Review/Care Everywhere.

## 2024-03-27 ENCOUNTER — VIRTUAL VISIT (OUTPATIENT)
Dept: FAMILY MEDICINE | Facility: CLINIC | Age: 54
End: 2024-03-27
Payer: COMMERCIAL

## 2024-03-27 DIAGNOSIS — F43.10 PTSD (POST-TRAUMATIC STRESS DISORDER): ICD-10-CM

## 2024-03-27 DIAGNOSIS — M79.7 FIBROMYALGIA: ICD-10-CM

## 2024-03-27 DIAGNOSIS — N95.1 SYMPTOMATIC MENOPAUSAL OR FEMALE CLIMACTERIC STATES: ICD-10-CM

## 2024-03-27 DIAGNOSIS — R53.83 OTHER FATIGUE: Primary | ICD-10-CM

## 2024-03-27 DIAGNOSIS — R53.82 CHRONIC FATIGUE: ICD-10-CM

## 2024-03-27 PROCEDURE — 99214 OFFICE O/P EST MOD 30 MIN: CPT | Mod: 95 | Performed by: PHYSICIAN ASSISTANT

## 2024-03-27 ASSESSMENT — ENCOUNTER SYMPTOMS: FATIGUE: 1

## 2024-03-27 NOTE — PROGRESS NOTES
Kenton is a 53 year old who is being evaluated via a billable video visit.    How would you like to obtain your AVS? MyChart  If the video visit is dropped, the invitation should be resent by: Send to e-mail at: [email or web address]  Will anyone else be joining your video visit? No      Assessment & Plan     -35 minutes spent by me on the date of the encounter doing chart review, review of outside records, review of test results, patient visit, and documentation     -discussed an option of trying HRT.  Side effects of medication(s) discussed as well as risks/benefits of taking    -pt will consider this and message if/when she is ready  -she is looking into low dose ketamine, will d/w her therapist.  I think she is a safe candidate for this  -has seen rheum, discussed another visit with them.  TBD    Other fatigue    Symptomatic menopausal or female climacteric states    Fibromyalgia    Chronic fatigue    PTSD (post-traumatic stress disorder)        Subjective   Kenton is a 53 year old, presenting for the following health issues:  Fatigue and Joints and Muscle pain      3/27/2024     7:59 AM   Additional Questions   Roomed by Elodia DALY     -ongoing fatigue, brain fog, chronic pain and PTSD  -is seeing a therapist regularly  -sister mentioned low dose ketamine as an option.     -ongoing chronic fatigue and pain.  Has seen rheum.  Has tried medcal cannabis, stimulants, gabapentin without improvement  -pain is in the feet, hips, hands.  Wakes her at night.  Work up with rheum I the past was unremarkable    -has not had a period in over a year.  Does have hot flashes    History of Present Illness       Reason for visit:  Fatigue and pain    She eats 2-3 servings of fruits and vegetables daily.She consumes 0 sweetened beverage(s) daily.She exercises with enough effort to increase her heart rate 60 or more minutes per day.  She exercises with enough effort to increase her heart rate 7 days per week.   She is taking medications  "regularly.         Objective    Vitals - Patient Reported  Weight (Patient Reported): 63.5 kg (140 lb)  Height (Patient Reported): 165.1 cm (5' 5\")  BMI (Based on Pt Reported Ht/Wt): 23.3  Pain Score: Severe Pain (6)  Pain Loc: Other - see comment (joints and muscle pain)        Physical Exam   GENERAL: alert and no distress  EYES: Eyes grossly normal to inspection.  No discharge or erythema, or obvious scleral/conjunctival abnormalities.  RESP: No audible wheeze, cough, or visible cyanosis.    SKIN: Visible skin clear. No significant rash, abnormal pigmentation or lesions.  NEURO: Cranial nerves grossly intact.  Mentation and speech appropriate for age.  PSYCH: Appropriate affect, tone, and pace of words        Video-Visit Details    Type of service:  Video Visit   Originating Location (pt. Location): Home    Distant Location (provider location):  On-site  Platform used for Video Visit: Sabine  Signed Electronically by: Luisa Garnett PA-C    "

## 2024-04-03 PROBLEM — F98.8 ADD (ATTENTION DEFICIT DISORDER) WITHOUT HYPERACTIVITY: Status: ACTIVE | Noted: 2024-04-03

## 2024-04-18 ENCOUNTER — MYC MEDICAL ADVICE (OUTPATIENT)
Dept: FAMILY MEDICINE | Facility: CLINIC | Age: 54
End: 2024-04-18
Payer: COMMERCIAL

## 2024-06-19 ENCOUNTER — VIRTUAL VISIT (OUTPATIENT)
Dept: URGENT CARE | Facility: CLINIC | Age: 54
End: 2024-06-19
Payer: COMMERCIAL

## 2024-06-19 DIAGNOSIS — L30.9 DERMATITIS: Primary | ICD-10-CM

## 2024-06-19 PROCEDURE — 99213 OFFICE O/P EST LOW 20 MIN: CPT | Mod: 95

## 2024-06-19 NOTE — PATIENT INSTRUCTIONS
I sent in some topical antibiotic cream for the rash as may be a rosacea or dermatitis rash. Continue moisturizer. Avoid any steroid cream.    I would recommend benadryl and ice for the eye swelling. If drowsy than a claritin or zyrtec would be alternate-not drowsy

## 2024-06-19 NOTE — PROGRESS NOTES
Assessment & Plan     Dermatitis  From description of visit it sounds like a roseaca/dermatitis rash. Will try metronidale cream and avoid steroids.  Continue moisturizer  If not improved be seen  For under eye swelling-ice/antihistamine  - metroNIDAZOLE (METROCREAM) 0.75 % external cream  Dispense: 45 g; Refill: 0             No follow-ups on file.    Virtual Urgent Care  [facility] VIRTUAL URGENT CARE    Subjective     Kenton is a 54 year old female who presents to virtual urgent care clinic today for the following health issues:  No chief complaint on file.      HPI    Dry scaly patches on the face.  Video connection poor  No itchy  Was doing a demolition of closet.  No reddness of rash  Rash is improving.  No skin rash    Has performed a detox process.    Amwel video visit 10 minutes        Review of Systems        Objective    LMP 10/01/2022 (Approximate)   Physical Exam  Constitutional:       Comments: Noted enlargement of skin under eyes  The rash was difficult to see due to poor image quality.

## 2024-06-20 ENCOUNTER — OFFICE VISIT (OUTPATIENT)
Dept: URGENT CARE | Facility: URGENT CARE | Age: 54
End: 2024-06-20
Payer: COMMERCIAL

## 2024-06-20 VITALS
BODY MASS INDEX: 21.66 KG/M2 | OXYGEN SATURATION: 98 % | DIASTOLIC BLOOD PRESSURE: 67 MMHG | SYSTOLIC BLOOD PRESSURE: 103 MMHG | HEART RATE: 100 BPM | RESPIRATION RATE: 14 BRPM | TEMPERATURE: 97.2 F | HEIGHT: 65 IN | WEIGHT: 130 LBS

## 2024-06-20 DIAGNOSIS — R53.82 CHRONIC FATIGUE: ICD-10-CM

## 2024-06-20 DIAGNOSIS — K13.0 CHEILITIS DUE TO ATOPIC DERMATITIS: ICD-10-CM

## 2024-06-20 DIAGNOSIS — L20.9 ATOPIC DERMATITIS OF FACE: Primary | ICD-10-CM

## 2024-06-20 DIAGNOSIS — D89.49 OTHER MAST CELL ACTIVATION DISORDER (H): Chronic | ICD-10-CM

## 2024-06-20 DIAGNOSIS — L20.9 CHEILITIS DUE TO ATOPIC DERMATITIS: ICD-10-CM

## 2024-06-20 PROCEDURE — 99215 OFFICE O/P EST HI 40 MIN: CPT | Performed by: FAMILY MEDICINE

## 2024-06-20 NOTE — PATIENT INSTRUCTIONS
Stop all lotions and creams -nothing on your face at all    Wash face with unscented Cetaphil    Apply cold packs to the swollen area under eyes       Apply over-the-counter hydrocortisone to the welt on your lower face, do not place on your eyelids or on areas of swelling     Start Zyrtec daily for 30 days    Take benadryl ( diphenhydramine) at bedtime will help with rash and swelling       Use facial sunscreen with mineral only content-zinc oxide should be the major ingredient and scent free    Will call you with plan regarding the dryness near corners of your lips      Addendum-    The area around the corner of your lips is called angular cheilitis.  Can also be caused by a the creams you are using for your face.  Your facial creams can cause something called an atopic dermatitis and can occur after many months of using facial creams.   Treatment plan  Start over-the-counter hydrocortisone cream to the area until the area resolves but not for no more than 2 weeks and then after you apply the hydrocortisone cream follow-up with either Vaseline or Aquaphor to treat the dryness    IN Addition-Treatment option for your upper eyelids   recent studies showed that you can now apply the counter 1% hydrocortisone cream very sparingly to the upper eyelids and take care not to allow the cream to go into your eye and at bedtime for no more than 2 weeks

## 2024-06-20 NOTE — PROGRESS NOTES
ASSESSMENT/PLAN:      ICD-10-CM    1. Atopic dermatitis of face  L20.9       2. Cheilitis due to atopic dermatitis  K13.0     L20.9       3. Other mast cell activation disorder (H24)  D89.49     noted in problem list-last seen [facility] 2017, now [facility], no fu noted -care everywhere ? source of facial swelling /allergic reaction facial lotions/creams,      4. Chronic fatigue  R53.82     per pt ongoing issue, now with worsening fatigue started at time of onset of facial rash and swelling,          Patient Instructions     Stop all lotions and creams -nothing on your face at all    Wash face with unscented Cetaphil    Apply cold packs to the swollen area under eyes       Apply over-the-counter hydrocortisone to the welt on your lower face, do not place on your eyelids or on areas of swelling     Start Zyrtec daily for 30 days    Take benadryl ( diphenhydramine) at bedtime will help with rash and swelling       Use facial sunscreen with mineral only content-zinc oxide should be the major ingredient and scent free    Will call you with plan regarding the dryness near corners of your lips      Addendum-    The area around the corner of your lips is called angular cheilitis.  Can also be caused by a the creams you are using for your face.  Your facial creams can cause something called an atopic dermatitis and can occur after many months of using facial creams.   Treatment plan  Start over-the-counter hydrocortisone cream to the area until the area resolves but not for no more than 2 weeks and then after you apply the hydrocortisone cream follow-up with either Vaseline or Aquaphor to treat the dryness    IN Addition-Treatment option for your upper eyelids   recent studies showed that you can now apply the counter 1% hydrocortisone cream very sparingly to the upper eyelids and take care not to allow the cream to go into your eye and at bedtime for no more than 2 weeks     Addendum  Will contact patient in am-angular  cheilitis, -atopic-apply hydrocortisone cream to area 2 tines a day until resolves, no more than 2  weeks can follow with vaseline or aquaphor to treat the dryness     6/21/2024  Called patient and left voicemail with place an addendum on her after visit summary that she could review via [employer] with the plan for treating her angular chilliness in addition I also added options for her atopic dermatitis of her upper eyelids  Karol Acosta MD      Reviewed medication instructions and side effects. Follow up if experiences side effects.     I reviewed supportive care, otc meds to use if needed, expected course, and signs of concern.  Follow up as needed or if she does not improve within  1-2 days or if worsens in any way.  Reviewed red flag symptoms and is to go to the ER if experiences any of these.     The use of Dragon/enStageation services may have been used to construct the content in this note; any grammatical or spelling errors are non-intentional. Please contact the author of this note directly if you are in need of any clarification.      On the day of the encounter, time spend on chart review, patient visit, review of testing, documentation was 40 minutes              Patient presents with:  Urgent Care  Eye Problem: Patient presents with her under eyes swollen, facial rash and has a lot of fatigue that started on Monday.   Fatigue  Derm Problem       Subjective     Kenton Mathew is a 54 year old female who presents to clinic today for the following health issues:    HPI     Patient with onset 4 days ago of swelling under her eye, facial rash-seen yesterday 6/29/2024 by her family medicine physician virtually and diagnosed with rosacea/ dermatitis and prescribed metronidazole cream and told to avoid steroids and continue with her moisturizer    Patient also noted worsening of fatigue with onset of swelling/rash-ongoing issue dx of  chronic fatigue, working with PCP/speciality care-considering HRT   No  fever/chills, no URI symptoms, no n/v/d, no uti symptoms    Concerned this may be more of an allergic reaction- outbreak of urticarial lesion on her lower face since virtual visit yesterday-she did not start the metronidazole    She uses topical eye and facial products for aging skin and a moisturizer that are all-natural but not scent free-has used these products for the last 6 months -  of note she does not use these products on her neck and does not have urticaria or rash on neck     No other known new exposures-she has not been in the sun, no recent swimming in a pool or lake   ? exposure to other chemicals or environmental allergens -with recent completed demolition of a closet     In addition she has noted some scaling and cracking of the corner of her lips-history of iron deficiency in the past, took iron supplement and tole now stable and off iron now since 12/2023  -   11/30/2023 CBC-normal hgb/hematocrit 11/2023, normal RBC indices, normal plt  MCV mild elevated   No new lip balm    She will be traveling for a planned vacation in the next week and will be out in the sun for most of the day        Past Medical History:   Diagnosis Date    Celiac disease     Fibromyalgia     Vertebral artery dissection (H24) 2001    takes baby aspirin daily     Social History     Tobacco Use    Smoking status: Never     Passive exposure: Never    Smokeless tobacco: Never   Substance Use Topics    Alcohol use: Not on file       Current Outpatient Medications   Medication Sig Dispense Refill    magnesium 250 MG tablet Take 1 tablet by mouth daily (Patient not taking: Reported on 11/7/2023)      metroNIDAZOLE (METROCREAM) 0.75 % external cream Apply topically 2 times daily 45 g 0    Multiple Vitamins-Iron (MULTIVITAMIN/IRON PO) Take 1 tablet by mouth daily (Patient not taking: Reported on 11/7/2023)      multivitamin w/minerals (MULTI-VITAMIN) tablet Take 1 tablet by mouth daily      tacrolimus (PROTOPIC) 0.03 % external  "ointment Apply topically 2 times daily 60 g 1    TRAZODONE HCL PO Take 100 mg by mouth daily       VITAMIN D, CHOLECALCIFEROL, PO Take 4,000 Units by mouth daily       Allergies   Allergen Reactions    Gluten Meal GI Disturbance, Nausea and Other (See Comments)     Other reaction(s): GI Disturbance   PN Reaction Type: Allergy; PN Noted: 20111215    Other reaction(s): Other - Describe In Comment Field   Other reaction(s): GI Disturbance   PN Reaction Type: Allergy; PN Noted: 20111215   Other reaction(s): GI Disturbance   PN Reaction Type: Allergy; PN Noted: 20111215    Corn Oil Hives             ROS are negative, except as otherwise noted HPI      Objective    /67 (BP Location: Right arm)   Pulse 100   Temp 97.2  F (36.2  C) (Temporal)   Resp 14   Ht 1.651 m (5' 5\")   Wt 59 kg (130 lb)   LMP 10/01/2022 (Approximate)   SpO2 98%   BMI 21.63 kg/m    Body mass index is 21.63 kg/m .  Physical Exam   GENERAL: Fatigued and mild distress  EYES: Eyes PERRL and EOMI  conjunctivae and sclerae normal  Eyelids-swelling of upper and lower eyelids skin dryness is fine scale upper eyelids and lower eyelids minimal erythema nontender, swelling of soft tissue under the lower eyelids  Swelling is mild to moderate patient is able to fully open her eyes  HEENT-mild facial swelling of face, mid face dry scaly rash with mild erythema, no lesions, non tender   One urticarial lesion lower face   Lips-upper and lower lips mild swelling, dry, small amount of scale, corners of lips with fissures/mild erythema and crusting  NECK: no adenopathy, no asymmetry  RESP: lungs clear to auscultation - no rales, rhonchi or wheezes  CV: regular rate and rhythm, normal S1 S2, no S3 or S4, no murmur, click or rub,   SKIN-no other lesions/rash present except as noted on face   NEURO: Normal strength and tone, mentation intact and speech normal, normal gait      Diagnostic Test Results:  Labs reviewed in Epic  No results found for any visits on " 06/20/24.

## 2024-07-16 ENCOUNTER — VIRTUAL VISIT (OUTPATIENT)
Dept: FAMILY MEDICINE | Facility: CLINIC | Age: 54
End: 2024-07-16
Payer: COMMERCIAL

## 2024-07-16 DIAGNOSIS — Z78.0 POSTMENOPAUSAL STATUS: ICD-10-CM

## 2024-07-16 DIAGNOSIS — R21 RASH: Primary | ICD-10-CM

## 2024-07-16 PROCEDURE — 99214 OFFICE O/P EST MOD 30 MIN: CPT | Mod: 95 | Performed by: PHYSICIAN ASSISTANT

## 2024-07-16 RX ORDER — TACROLIMUS 0.3 MG/G
OINTMENT TOPICAL 2 TIMES DAILY
Qty: 60 G | Refills: 1 | Status: SHIPPED | OUTPATIENT
Start: 2024-07-16

## 2024-07-16 NOTE — PROGRESS NOTES
Kenton is a 54 year old who is being evaluated via a billable video visit.    How would you like to obtain your AVS? MyChart  If the video visit is dropped, the invitation should be resent by: Text to cell phone: [phone]  Will anyone else be joining your video visit? No      Assessment & Plan     Rash  -trial of Protopic on the eyelids and nasolabial areas.  Side effects of medication(s) discussed as well as risks/benefits of taking    - tacrolimus (PROTOPIC) 0.03 % external ointment; Apply topically 2 times daily  - TSH with free T4 reflex; Future  - Methylmalonic Acid; Future  - Vitamin D Deficiency; Future  - CBC with platelets; Future  - CRP, inflammation; Future  - Comprehensive metabolic panel (BMP + Alb, Alk Phos, ALT, AST, Total. Bili, TP); Future  - Hemoglobin A1c; Future  - Estradiol; Future  - Testosterone Free and Total; Future  - Progesterone; Future  - Magnesium; Future    Postmenopausal status  -repeat labs today.  Discussed HRT as an option.    -I will notify pt of results when available   - tacrolimus (PROTOPIC) 0.03 % external ointment; Apply topically 2 times daily  - TSH with free T4 reflex; Future  - Methylmalonic Acid; Future  - Vitamin D Deficiency; Future  - CBC with platelets; Future  - CRP, inflammation; Future  - Comprehensive metabolic panel (BMP + Alb, Alk Phos, ALT, AST, Total. Bili, TP); Future  - Hemoglobin A1c; Future  - Estradiol; Future  - Testosterone Free and Total; Future  - Progesterone; Future  - Magnesium; Future      35 minutes spent by me on the date of the encounter doing chart review, interpretation of tests, patient visit, and documentation         Subjective   Kenton is a 54 year old, presenting for the following health issues:  Derm Problem (Pt video today if for skin peeling and eye swelling, pt stated that eye swelling started on 6/17.)      7/16/2024     8:05 AM   Additional Questions   Roomed by Jami   Accompanied by SELF     Video Start Time:     -h/o fatigue,  ongoing.  Has seen multiple specialties for this without much insight  -has tried HRT. Antidepressants, stimulants  -was feeling better when she did a cleanse and was using supplements and a smoothie.  Sxs returned about 3 weeks later  -no known food issues    -also has dry, flaky skin on the face--mainly the eyelids and around the nose/nostrils  -no changes in skin products           Objective           Vitals:  No vitals were obtained today due to virtual visit.    Physical Exam   GENERAL: alert and no distress  EYES: Eyes grossly normal to inspection.  No discharge or erythema, or obvious scleral/conjunctival abnormalities.  RESP: No audible wheeze, cough, or visible cyanosis.    SKIN: Visible skin clear. No significant rash, abnormal pigmentation or lesions.  NEURO: Cranial nerves grossly intact.  Mentation and speech appropriate for age.  PSYCH: Appropriate affect, tone, and pace of words        Video-Visit Details    Type of service:  Video Visit     Originating Location (pt. Location): Home    Distant Location (provider location):  On-site  Platform used for Video Visit: Sabine  Signed Electronically by: Luisa Garnett PA-C

## 2024-07-17 ENCOUNTER — TELEPHONE (OUTPATIENT)
Dept: FAMILY MEDICINE | Facility: CLINIC | Age: 54
End: 2024-07-17
Payer: COMMERCIAL

## 2024-07-23 NOTE — TELEPHONE ENCOUNTER
Prior Authorization Approval    Medication: TACROLIMUS 0.03 % EX OINT  Authorization Effective Date: 4/24/2024  Authorization Expiration Date: 7/23/2025  Approved Dose/Quantity:   Reference #:     Insurance Company: [employer] Clinical Review - Phone [phone] Fax [phone]  Expected CoPay: $    CoPay Card Available:      Financial Assistance Needed:   Which Pharmacy is filling the prescription: [id number] [place] [address] [facility] [address] [facility]  Pharmacy Notified: YES  Patient Notified: **Instructed pharmacy to notify patient when script is ready to /ship.**

## 2024-07-23 NOTE — TELEPHONE ENCOUNTER
PA Initiation    Medication: TACROLIMUS 0.03 % EX OINT  Insurance Company: [employer] Clinical Review - Phone [phone] Fax [phone]  Pharmacy Filling the Rx: [id number] [place], [place] - [address] AT [facility] [address]  Filling Pharmacy Phone: [phone]  Filling Pharmacy Fax: [phone]  Start Date: 7/22/2024

## 2024-07-29 ENCOUNTER — MYC MEDICAL ADVICE (OUTPATIENT)
Dept: FAMILY MEDICINE | Facility: CLINIC | Age: 54
End: 2024-07-29
Payer: COMMERCIAL

## 2024-07-29 DIAGNOSIS — N89.8 VAGINAL ITCHING: ICD-10-CM

## 2024-07-29 DIAGNOSIS — Z78.0 POSTMENOPAUSAL STATUS: Primary | ICD-10-CM

## 2024-07-30 RX ORDER — ESTRADIOL 0.04 MG/D
1 PATCH TRANSDERMAL WEEKLY
Qty: 4 PATCH | Refills: 11 | Status: SHIPPED | OUTPATIENT
Start: 2024-07-30

## 2024-07-30 RX ORDER — PROGESTERONE 200 MG/1
200 CAPSULE ORAL DAILY
Qty: 90 CAPSULE | Refills: 3 | Status: SHIPPED | OUTPATIENT
Start: 2024-07-30

## 2024-08-09 RX ORDER — FLUCONAZOLE 150 MG/1
150 TABLET ORAL ONCE
Qty: 1 TABLET | Refills: 0 | Status: SHIPPED | OUTPATIENT
Start: 2024-08-09 | End: 2024-08-09

## 2024-08-15 ENCOUNTER — TELEPHONE (OUTPATIENT)
Dept: FAMILY MEDICINE | Facility: CLINIC | Age: 54
End: 2024-08-15
Payer: COMMERCIAL

## 2024-08-15 NOTE — TELEPHONE ENCOUNTER
First attempt: LVM for patient requesting return call to clinic. If patient calls back, transfer to RN to triage for anxiety and schedule as appropriate.    Note: PCP has limited availability during next ~10 days. Working half-day (fully booked) 8/16, next week only in-clinic 8/20.     Tessa Maldonado RN  [facility]

## 2024-08-15 NOTE — TELEPHONE ENCOUNTER
Reason for Call:  Appointment Request    Patient requesting this type of appt: Chronic Diease Management/Medication/Follow-Up    Requested provider: Luisa Garnett    Reason patient unable to be scheduled: Not within requested timeframe    When does patient want to be seen/preferred time: Same day or asap    Comments: Pt need an appt for anxiety    Could we send this information to you in [facility] or would you prefer to receive a phone call?:   Patient would prefer a phone call   Okay to leave a detailed message?: Yes at Cell number on file:    Telephone Information:   Mobile [phone]       Call taken on 8/15/2024 at 10:38 AM by Mirna Laura

## 2024-08-16 NOTE — TELEPHONE ENCOUNTER
Patient calling back and scheduled an appointment on 8/19/24 with Dr Nettles  No further action needed    Karol Phillips RN  [facility]

## 2024-09-25 ENCOUNTER — OFFICE VISIT (OUTPATIENT)
Dept: OPHTHALMOLOGY | Facility: CLINIC | Age: 54
End: 2024-09-25
Payer: COMMERCIAL

## 2024-09-25 DIAGNOSIS — H57.02 PHYSIOLOGIC ANISOCORIA: ICD-10-CM

## 2024-09-25 DIAGNOSIS — H52.13 MYOPIA OF BOTH EYES: Primary | ICD-10-CM

## 2024-09-25 PROCEDURE — 92015 DETERMINE REFRACTIVE STATE: CPT | Performed by: OPTOMETRIST

## 2024-09-25 PROCEDURE — 92014 COMPRE OPH EXAM EST PT 1/>: CPT | Performed by: OPTOMETRIST

## 2024-09-25 PROCEDURE — 92310 CONTACT LENS FITTING OU: CPT | Performed by: OPTOMETRIST

## 2024-09-25 ASSESSMENT — CONF VISUAL FIELD
OS_SUPERIOR_NASAL_RESTRICTION: 0
OS_NORMAL: 1
OD_SUPERIOR_TEMPORAL_RESTRICTION: 0
OS_INFERIOR_NASAL_RESTRICTION: 0
OS_SUPERIOR_TEMPORAL_RESTRICTION: 0
OS_INFERIOR_TEMPORAL_RESTRICTION: 0
OD_INFERIOR_NASAL_RESTRICTION: 0
OD_SUPERIOR_NASAL_RESTRICTION: 0
OD_NORMAL: 1
OD_INFERIOR_TEMPORAL_RESTRICTION: 0

## 2024-09-25 ASSESSMENT — VISUAL ACUITY
OS_CC: 20/20
VA_OR_OS_CURRENT_RX: 20/20
OD_CC: 20/20
VA_OR_OD_CURRENT_RX: 20/20
OD_CC: J3
CORRECTION_TYPE: GLASSES
METHOD: SNELLEN - LINEAR
OS_CC: J3

## 2024-09-25 ASSESSMENT — REFRACTION_WEARINGRX
OD_CYLINDER: +0.25
OS_ADD: +1.75
OS_SPHERE: -7.25
OD_AXIS: 180
OD_SPHERE: -6.50
OS_CYLINDER: +0.75
OS_AXIS: 145
OD_ADD: +1.75

## 2024-09-25 ASSESSMENT — REFRACTION_CURRENTRX
OS_BASECURVE: 8.5
OD_DIAMETER: 14.2
OS_DIAMETER: 14.2
OD_SPHERE: -6.00
OD_ADD: HIGH
OS_SPHERE: -6.00
OD_BASECURVE: 8.5
OS_ADD: HIGH

## 2024-09-25 ASSESSMENT — TONOMETRY
IOP_METHOD: ICARE
OD_IOP_MMHG: 18
OS_IOP_MMHG: 17

## 2024-09-25 ASSESSMENT — REFRACTION_MANIFEST
OD_SPHERE: -6.25
OS_CYLINDER: +0.75
OD_ADD: +2.00
OS_AXIS: 135
OS_ADD: +2.00
OD_CYLINDER: SPHERE
OS_SPHERE: -7.00

## 2024-09-25 ASSESSMENT — SLIT LAMP EXAM - LIDS
COMMENTS: NORMAL
COMMENTS: NORMAL

## 2024-09-25 ASSESSMENT — EXTERNAL EXAM - RIGHT EYE: OD_EXAM: NORMAL

## 2024-09-25 ASSESSMENT — EXTERNAL EXAM - LEFT EYE: OS_EXAM: NORMAL

## 2024-09-25 ASSESSMENT — CUP TO DISC RATIO
OS_RATIO: 0.3
OD_RATIO: 0.25

## 2024-09-25 NOTE — PROGRESS NOTES
History  HPI       Annual Eye Exam    In both eyes. Additional comments: Has noticed that vision has gotten worse at near with glasses and contacts. Wears glasses most of the time. Will sometimes take glasses off to see better up close. No other ocular concerns.          Last edited by Meng Dinh on 9/25/2024  1:24 PM.          Assessment/Plan  (H52.13) Myopia of both eyes  (primary encounter diagnosis)  Comment: Myopia both eyes with presbyopia  Plan: REFRACTION, NH CONTACT LENS FITTING COSMETIC LVL 1 - ADULT         Educated patient on condition and clinical findings. Dispensed spectacle prescription for full time wear. Monitor annually.   Dispensed trial lenses and finalized contact lens prescription for 2 years. If noting new lenses are not comfortable at near, recommended habitual lens right eye, new lens left eye. If this is preferred, contact clinic for updated prescription.    Addendum: After wearing the lenses for several days, the patient reported that she does prefer the habitual lens in the right eye, new lens in the left eye. Updated finalized contact lens prescription.    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    Return to clinic in 1 year for comprehensive eye exam.    Complete documentation of historical and exam elements from today's encounter can  be found in the full encounter summary report (not reduplicated in this progress  note). I personally obtained the chief complaint(s) and history of present illness. I  confirmed and edited as necessary the review of systems, past medical/surgical  history, family history, social history, and examination findings as documented by  others; and I examined the patient myself. I personally reviewed the relevant tests,  images, and reports as documented above. I formulated and edited as necessary the  assessment and plan and discussed the findings and management plan with the  patient  and family.    Thomas Ray OD, FAAO

## 2025-01-07 ENCOUNTER — HOSPITAL ENCOUNTER (EMERGENCY)
Facility: CLINIC | Age: 55
Discharge: HOME | End: 2025-01-07
Admitting: EMERGENCY MEDICINE
Payer: COMMERCIAL

## 2025-01-07 VITALS
TEMPERATURE: 97 F | HEART RATE: 98 BPM | DIASTOLIC BLOOD PRESSURE: 88 MMHG | OXYGEN SATURATION: 98 % | BODY MASS INDEX: 21.66 KG/M2 | SYSTOLIC BLOOD PRESSURE: 144 MMHG | WEIGHT: 130 LBS | RESPIRATION RATE: 16 BRPM | HEIGHT: 65 IN

## 2025-01-07 LAB
ATRIAL RATE - MUSE: 90 BPM
DIASTOLIC BLOOD PRESSURE - MUSE: NORMAL MMHG
FLUAV RNA SPEC QL NAA+PROBE: NEGATIVE
FLUBV RNA RESP QL NAA+PROBE: NEGATIVE
INTERPRETATION ECG - MUSE: NORMAL
P AXIS - MUSE: 86 DEGREES
PR INTERVAL - MUSE: 150 MS
QRS DURATION - MUSE: 74 MS
QT - MUSE: 358 MS
QTC - MUSE: 437 MS
R AXIS - MUSE: 89 DEGREES
RSV RNA SPEC NAA+PROBE: NEGATIVE
SARS-COV-2 RNA RESP QL NAA+PROBE: NEGATIVE
SYSTOLIC BLOOD PRESSURE - MUSE: NORMAL MMHG
T AXIS - MUSE: 65 DEGREES
VENTRICULAR RATE- MUSE: 90 BPM

## 2025-01-07 PROCEDURE — 93005 ELECTROCARDIOGRAM TRACING: CPT

## 2025-01-07 PROCEDURE — 87637 SARSCOV2&INF A&B&RSV AMP PRB: CPT | Performed by: EMERGENCY MEDICINE

## 2025-01-07 PROCEDURE — 99281 EMR DPT VST MAYX REQ PHY/QHP: CPT

## 2025-01-07 ASSESSMENT — ACTIVITIES OF DAILY LIVING (ADL)
ADLS_ACUITY_SCORE: 41
ADLS_ACUITY_SCORE: 41

## 2025-01-08 ENCOUNTER — VIRTUAL VISIT (OUTPATIENT)
Dept: FAMILY MEDICINE | Facility: CLINIC | Age: 55
End: 2025-01-08
Payer: COMMERCIAL

## 2025-01-08 DIAGNOSIS — R53.82 CHRONIC FATIGUE: ICD-10-CM

## 2025-01-08 DIAGNOSIS — M79.7 FIBROMYALGIA: Primary | ICD-10-CM

## 2025-01-08 DIAGNOSIS — D75.89 MACROCYTOSIS: ICD-10-CM

## 2025-01-08 DIAGNOSIS — N95.1 SYMPTOMATIC MENOPAUSAL OR FEMALE CLIMACTERIC STATES: ICD-10-CM

## 2025-01-08 PROCEDURE — 98006 SYNCH AUDIO-VIDEO EST MOD 30: CPT | Performed by: PHYSICIAN ASSISTANT

## 2025-01-08 RX ORDER — ESTRADIOL 0.06 MG/D
1 PATCH TRANSDERMAL WEEKLY
Qty: 12 PATCH | Refills: 3 | Status: SHIPPED | OUTPATIENT
Start: 2025-01-08

## 2025-01-08 ASSESSMENT — ENCOUNTER SYMPTOMS: FATIGUE: 1

## 2025-01-08 NOTE — PROGRESS NOTES
Kenton is a 54 year old who is being evaluated via a billable video visit.    How would you like to obtain your AVS? MyChart  If the video visit is dropped, the invitation should be resent by: Text to cell phone: [phone]  Will anyone else be joining your video visit? No      Assessment & Plan     -will do labs today  -I will notify pt of results when available     Fibromyalgia  - Vitamin D Deficiency; Future  - Ferritin; Future  - CBC with platelets; Future  - Comprehensive metabolic panel (BMP + Alb, Alk Phos, ALT, AST, Total. Bili, TP); Future  - Folate; Future  - Adult Rheumatology  Referral; Future  - CRP, inflammation; Future    Macrocytosis  - Vitamin D Deficiency; Future  - Ferritin; Future  - CBC with platelets; Future  - Comprehensive metabolic panel (BMP + Alb, Alk Phos, ALT, AST, Total. Bili, TP); Future  - Folate; Future  - Adult Rheumatology  Referral; Future    Chronic fatigue  - Vitamin D Deficiency; Future  - Ferritin; Future  - CBC with platelets; Future  - Comprehensive metabolic panel (BMP + Alb, Alk Phos, ALT, AST, Total. Bili, TP); Future  - Folate; Future  - Adult Rheumatology  Referral; Future  - CRP, inflammation; Future  - Adult Sleep Eval & Management  Referral; Future    Symptomatic menopausal or female climacteric states  - estradiol (CLIMARA) 0.06 MG/24HR weekly patch; Place 1 patch over 168 hours onto the skin once a week.    30 minutes spent by me on the date of the encounter doing chart review, review of outside records, review of test results, interpretation of tests, and documentation           Subjective   Kenton is a 54 year old, presenting for the following health issues:  neck pain  and Fatigue        7/16/2024     8:05 AM   Additional Questions   Roomed by Jami   Accompanied by SELF       Video Start Time: 4:48 PM    -more tired than normal past few weeks    -ongoing neck pain joints are sore, muscles.  Different from normal aches and  "pains    -neck is sore, feels \"spacy\"  -was looking down walking the dog and felt very dizzy and disoriented    -last 4 days has had diarrhea and stomach cramps    History of Present Illness      She is taking medications regularly.                   Objective    Vitals - Patient Reported  Weight (Patient Reported): 59 kg (130 lb)        Physical Exam   GENERAL: alert and no distress  EYES: Eyes grossly normal to inspection.  No discharge or erythema, or obvious scleral/conjunctival abnormalities.  RESP: No audible wheeze, cough, or visible cyanosis.    SKIN: Visible skin clear. No significant rash, abnormal pigmentation or lesions.  NEURO: Cranial nerves grossly intact.  Mentation and speech appropriate for age.  PSYCH: Appropriate affect, tone, and pace of words        Video-Visit Details    Type of service:  Video Visit   Video End Time:  Originating Location (pt. Location): Home    Distant Location (provider location):  On-site  Platform used for Video Visit: Sabine  Signed Electronically by: Luisa Garnett PA-C    "

## 2025-01-09 ENCOUNTER — LAB (OUTPATIENT)
Dept: LAB | Facility: CLINIC | Age: 55
End: 2025-01-09
Payer: COMMERCIAL

## 2025-01-09 DIAGNOSIS — R53.82 CHRONIC FATIGUE: ICD-10-CM

## 2025-01-09 DIAGNOSIS — M79.7 FIBROMYALGIA: ICD-10-CM

## 2025-01-09 DIAGNOSIS — D75.89 MACROCYTOSIS: ICD-10-CM

## 2025-01-09 DIAGNOSIS — Z13.6 SCREENING FOR CARDIOVASCULAR CONDITION: Primary | ICD-10-CM

## 2025-01-09 LAB
ALBUMIN SERPL BCG-MCNC: 4.6 G/DL (ref 3.5–5.2)
ALP SERPL-CCNC: 94 U/L (ref 40–150)
ALT SERPL W P-5'-P-CCNC: 17 U/L (ref 0–50)
ANION GAP SERPL CALCULATED.3IONS-SCNC: 12 MMOL/L (ref 7–15)
AST SERPL W P-5'-P-CCNC: 24 U/L (ref 0–45)
BILIRUB SERPL-MCNC: 0.4 MG/DL
BUN SERPL-MCNC: 15 MG/DL (ref 6–20)
CALCIUM SERPL-MCNC: 9.6 MG/DL (ref 8.8–10.4)
CHLORIDE SERPL-SCNC: 107 MMOL/L (ref 98–107)
CHOLEST SERPL-MCNC: 224 MG/DL
CREAT SERPL-MCNC: 1.12 MG/DL (ref 0.51–0.95)
CRP SERPL-MCNC: <3 MG/L
EGFRCR SERPLBLD CKD-EPI 2021: 59 ML/MIN/1.73M2
ERYTHROCYTE [DISTWIDTH] IN BLOOD BY AUTOMATED COUNT: 12.1 % (ref 10–15)
FASTING STATUS PATIENT QL REPORTED: NO
FASTING STATUS PATIENT QL REPORTED: NO
FERRITIN SERPL-MCNC: 101 NG/ML (ref 11–328)
FOLATE SERPL-MCNC: 4.9 NG/ML (ref 4.6–34.8)
GLUCOSE SERPL-MCNC: 89 MG/DL (ref 70–99)
HCO3 SERPL-SCNC: 23 MMOL/L (ref 22–29)
HCT VFR BLD AUTO: 46.4 % (ref 35–47)
HDLC SERPL-MCNC: 70 MG/DL
HGB BLD-MCNC: 14.5 G/DL (ref 11.7–15.7)
LDLC SERPL CALC-MCNC: 86 MG/DL
MCH RBC QN AUTO: 32.6 PG (ref 26.5–33)
MCHC RBC AUTO-ENTMCNC: 31.3 G/DL (ref 31.5–36.5)
MCV RBC AUTO: 104 FL (ref 78–100)
NONHDLC SERPL-MCNC: 154 MG/DL
PLATELET # BLD AUTO: 393 10E3/UL (ref 150–450)
POTASSIUM SERPL-SCNC: 4.2 MMOL/L (ref 3.4–5.3)
PROT SERPL-MCNC: 7.3 G/DL (ref 6.4–8.3)
RBC # BLD AUTO: 4.45 10E6/UL (ref 3.8–5.2)
SODIUM SERPL-SCNC: 142 MMOL/L (ref 135–145)
TRIGL SERPL-MCNC: 338 MG/DL
VIT D+METAB SERPL-MCNC: 35 NG/ML (ref 20–50)
WBC # BLD AUTO: 7.7 10E3/UL (ref 4–11)

## 2025-01-26 ENCOUNTER — HEALTH MAINTENANCE LETTER (OUTPATIENT)
Age: 55
End: 2025-01-26

## 2025-02-10 ENCOUNTER — MYC MEDICAL ADVICE (OUTPATIENT)
Dept: FAMILY MEDICINE | Facility: CLINIC | Age: 55
End: 2025-02-10
Payer: COMMERCIAL

## 2025-02-10 DIAGNOSIS — E55.9 VITAMIN D DEFICIENCY: ICD-10-CM

## 2025-02-10 DIAGNOSIS — R79.9 ABNORMAL BLOOD CHEMISTRY: ICD-10-CM

## 2025-02-10 DIAGNOSIS — E78.2 MIXED HYPERLIPIDEMIA: Primary | ICD-10-CM

## 2025-02-12 RX ORDER — CHOLECALCIFEROL (VITAMIN D3) 50 MCG
1 TABLET ORAL DAILY
Qty: 90 TABLET | Refills: 4 | Status: SHIPPED | OUTPATIENT
Start: 2025-02-12

## 2025-02-12 NOTE — TELEPHONE ENCOUNTER
Test Results    Who ordered the test:  Luisa ARGUELLES    Type of test: Lab    Date of test:  1/9/25    Where was the test performed:  [facility]    Message routed to Luisa ARGUELLES for review of lab results    Karol Phillips RN  [facility]    Kenton BERRY [facility] [place] (supporting Luisa Garnett PA-C)     Charlene Moran,  I need a recheck on my lipid panel and wondering if I should also recheck my creatinine and eGFR.  Those numbers seemed really out of range and concerning to me.  Also, I never received notification from my pharmacy that I had a prescription for Vit D.  Could you call that in again!  Thanks,  Kenton

## 2025-02-18 ENCOUNTER — LAB (OUTPATIENT)
Dept: LAB | Facility: CLINIC | Age: 55
End: 2025-02-18
Payer: COMMERCIAL

## 2025-02-18 DIAGNOSIS — E78.2 MIXED HYPERLIPIDEMIA: ICD-10-CM

## 2025-02-18 DIAGNOSIS — R79.9 ABNORMAL BLOOD CHEMISTRY: ICD-10-CM

## 2025-02-18 LAB
CHOLEST SERPL-MCNC: 199 MG/DL
CREAT SERPL-MCNC: 0.67 MG/DL (ref 0.51–0.95)
EGFRCR SERPLBLD CKD-EPI 2021: >90 ML/MIN/1.73M2
FASTING STATUS PATIENT QL REPORTED: YES
HDLC SERPL-MCNC: 73 MG/DL
LDLC SERPL CALC-MCNC: 111 MG/DL
NONHDLC SERPL-MCNC: 126 MG/DL
TRIGL SERPL-MCNC: 73 MG/DL

## 2025-02-18 PROCEDURE — 82565 ASSAY OF CREATININE: CPT

## 2025-02-18 PROCEDURE — 36415 COLL VENOUS BLD VENIPUNCTURE: CPT

## 2025-02-18 PROCEDURE — 80061 LIPID PANEL: CPT

## 2025-04-10 ENCOUNTER — VIRTUAL VISIT (OUTPATIENT)
Dept: FAMILY MEDICINE | Facility: CLINIC | Age: 55
End: 2025-04-10
Payer: COMMERCIAL

## 2025-04-10 DIAGNOSIS — E55.9 VITAMIN D DEFICIENCY: ICD-10-CM

## 2025-04-10 DIAGNOSIS — Z79.899 MEDICATION MANAGEMENT: ICD-10-CM

## 2025-04-10 DIAGNOSIS — Z78.0 POSTMENOPAUSAL STATUS: Primary | ICD-10-CM

## 2025-04-10 RX ORDER — ESTRADIOL 0.04 MG/D
1 PATCH TRANSDERMAL WEEKLY
Qty: 4 PATCH | Refills: 11 | Status: SHIPPED | OUTPATIENT
Start: 2025-04-10

## 2025-04-10 NOTE — PROGRESS NOTES
Kenton is a 54 year old who is being evaluated via a billable video visit.          Assessment & Plan     Postmenopausal status  -decrease estradiol patch to 0.0375mg for 3 months, reassess at that time.  Discussed tapering off completely id not finding significant benefit.    -Side effects of medication(s) discussed as well as risks/benefits of taking    -continue with prometrium 200mg at bedtime.    - estradiol (CLIMARA) 0.0375 MG/24HR weekly patch; Place 1 patch onto the skin once a week.    Medication management    Vitamin D deficiency  -start Oscal daily  -Side effects of medication(s) discussed as well as risks/benefits of taking    -recheck vitamin D in 3-6 months  - calcium carbonate-vitamin D (OSCAL) 500-5 MG-MCG tablet; Take 1 tablet by mouth daily.      33 minutes spent by me on the date of the encounter doing chart review, review of test results, interpretation of tests, patient visit, and documentation     Subjective   Kenton is a 54 year old, presenting for the following health issues:  No chief complaint on file.    Video Start Time:     -has been on HRT for the past 6 months, increased estradiol patch from 0.0375-->0.06mg patch  3 months ago  -hot flashes are gone but has noticed more intermittent vaginal bleeding, breast tenderness and fatigue  -taking prometrium 200mg at bedtime    -h/o low vitamin D, not taking calcium supplement    History of Present Illness       Reason for visit:  HRT follow up    She eats 2-3 servings of fruits and vegetables daily.She consumes 0 sweetened beverage(s) daily.She exercises with enough effort to increase her heart rate 10 to 19 minutes per day.  She exercises with enough effort to increase her heart rate 4 days per week.   She is taking medications regularly.            Objective           Vitals:  No vitals were obtained today due to virtual visit.    Physical Exam   GENERAL: alert and no distress  EYES: Eyes grossly normal to inspection.  No discharge or erythema, or  obvious scleral/conjunctival abnormalities.  RESP: No audible wheeze, cough, or visible cyanosis.    SKIN: Visible skin clear. No significant rash, abnormal pigmentation or lesions.  NEURO: Cranial nerves grossly intact.  Mentation and speech appropriate for age.  PSYCH: Appropriate affect, tone, and pace of words        Video-Visit Details    Type of service:  Video Visit   Video End Time:  Originating Location (pt. Location): Home    Distant Location (provider location):  Off-site  Platform used for Video Visit: Sabine  Signed Electronically by: Luisa Garnett PA-C

## 2025-05-17 DIAGNOSIS — Z78.0 POSTMENOPAUSAL STATUS: ICD-10-CM

## 2025-05-18 RX ORDER — ESTRADIOL 0.04 MG/D
PATCH TRANSDERMAL
Qty: 4 PATCH | Refills: 11 | OUTPATIENT
Start: 2025-05-18

## 2025-05-28 ENCOUNTER — VIRTUAL VISIT (OUTPATIENT)
Dept: FAMILY MEDICINE | Facility: CLINIC | Age: 55
End: 2025-05-28
Payer: COMMERCIAL

## 2025-05-28 DIAGNOSIS — J34.89: Primary | ICD-10-CM

## 2025-05-28 DIAGNOSIS — R53.82 CHRONIC FATIGUE: ICD-10-CM

## 2025-05-28 PROCEDURE — 98005 SYNCH AUDIO-VIDEO EST LOW 20: CPT | Performed by: PHYSICIAN ASSISTANT

## 2025-05-28 NOTE — PROGRESS NOTES
Kenton is a 54 year old who is being evaluated via a billable video visit.    How would you like to obtain your AVS? MyChart  If the video visit is dropped, the invitation should be resent by: Text to cell phone: [phone]  Will anyone else be joining your video visit? No      Assessment & Plan     Mucopurulent discharge from nose  -isolated event, no further nasal discharge.  Denies systemic sxs, no pain, no fevers  -ok to monitor  -discussed alarm signs and symptoms to monitor for and discussed when to be reevaluated in the UC or ED     Chronic fatigue  -recommend sleep study as next step.  She will call for an appt  - Adult Sleep Eval & Management  Referral; Future    25 minutes spent by me on the date of the encounter doing chart review, patient visit, and documentation       Subjective   Kenton is a 54 year old, presenting for the following health issues:  mucus  (Yellow stuff coming out of the mouth )      5/28/2025    11:39 AM   Additional Questions   Roomed by hser   Accompanied by self     Video Start Time:     -woke up this morning and had a runny nose with thick yellow/brown mucus out of the right side of the nose  -no new or changing pain in the face or sinuses   -no allergies     -googled this and saw this could mean a CSF leak  -denies any headache, vision changes, confusion, balance issues or fevers  -no head trauma, no recent surgical procedures    -also continues to feel tired all the time.  Has had extensive work up over the last few years without definitive cause.    -has not yet done a sleep study    History of Present Illness       Reason for visit:  Mucus    She eats 2-3 servings of fruits and vegetables daily.She consumes 1 sweetened beverage(s) daily.She exercises with enough effort to increase her heart rate 10 to 19 minutes per day.  She exercises with enough effort to increase her heart rate 3 or less days per week.   She is taking medications regularly.          Objective    Vitals  "- Patient Reported  Weight (Patient Reported): 61.2 kg (135 lb)  Height (Patient Reported): 165.1 cm (5' 5\")  BMI (Based on Pt Reported Ht/Wt): 22.46  Pain Score: No Pain (0)  Pain Loc: Other - see comment        Physical Exam   GENERAL: alert and no distress  EYES: Eyes grossly normal to inspection.  No discharge or erythema, or obvious scleral/conjunctival abnormalities.  RESP: No audible wheeze, cough, or visible cyanosis.    SKIN: Visible skin clear. No significant rash, abnormal pigmentation or lesions.  NEURO: Cranial nerves grossly intact.  Mentation and speech appropriate for age.  PSYCH: Appropriate affect, tone, and pace of words        Video-Visit Details    Type of service:  Video Visit   Video End Time:  Originating Location (pt. Location): Home    Distant Location (provider location):  On-site  Platform used for Video Visit: Sabine  Signed Electronically by: Luisa Garnett PA-C    "

## 2025-05-28 NOTE — PROGRESS NOTES
Prior to immunization administration, verified patients identity using patient s name and date of birth. Please see Immunization Activity for additional information.     Screening Questionnaire for Adult Immunization    Are you sick today?   No   Do you have allergies to medications, food, a vaccine component or latex?   Yes   Have you ever had a serious reaction after receiving a vaccination?   No   Do you have a long-term health problem with heart, lung, kidney, or metabolic disease (e.g., diabetes), asthma, a blood disorder, no spleen, complement component deficiency, a cochlear implant, or a spinal fluid leak?  Are you on long-term aspirin therapy?   No   Do you have cancer, leukemia, HIV/AIDS, or any other immune system problem?   No   Do you have a parent, brother, or sister with an immune system problem?   No   In the past 3 months, have you taken medications that affect  your immune system, such as prednisone, other steroids, or anticancer drugs; drugs for the treatment of rheumatoid arthritis, Crohn s disease, or psoriasis; or have you had radiation treatments?   No   Have you had a seizure, or a brain or other nervous system problem?   No   During the past year, have you received a transfusion of blood or blood    products, or been given immune (gamma) globulin or antiviral drug?   No   For women: Are you pregnant or is there a chance you could become       pregnant during the next month?   No   Have you received any vaccinations in the past 4 weeks?   No     Immunization questionnaire was positive for at least one answer.  Notified provider.      Patient instructed to remain in clinic for 15 minutes afterwards, and to report any adverse reactions.     Screening performed by Denver Dewey MA on 5/28/2025 at 11:45 AM.

## 2025-05-29 ENCOUNTER — NURSE TRIAGE (OUTPATIENT)
Dept: NURSING | Facility: CLINIC | Age: 55
End: 2025-05-29
Payer: COMMERCIAL

## 2025-05-29 NOTE — TELEPHONE ENCOUNTER
ADS nurse spoke to patient and relayed the providers response that the ADS would not be able to definitively rule out a cerebral spinal fluid leak for the patient. We may be able to address the headache and sinus issue but we are unable to sample the fluid as we are unable to do that lab,nor training. Nurse states that if patient is concerned about CSF leak she would need the ER for a more thorough evaluation per provider. Patient wants more immediate evaluation for CSF leak and opted for the ER.     NICO Palma  [facility] Triage

## 2025-05-29 NOTE — TELEPHONE ENCOUNTER
Nurse Triage SBAR    Is this a 2nd Level Triage? YES, LICENSED PRACTITIONER REVIEW IS REQUIRED    Situation: PCP input needed on continued symptoms of watery yellow fluid from right nare.     Background:   Yesterday patent spoke to PCP about clear yellow liquid that came out of her nose. This morning she rolled over from her left to her right and felt the liquid coming out of her nose again on the right side. It was clear yellow again. It is very watery and drains out very fast. During the day there is no sinus congestion and this does not happen at all during the day. No fever. No sinus pain. Dull headache this morning. Patient can bring her chin to her chest but states her neck is a little tight and has medical history of fibromyalgia. Yesterday she felt a little off or spacey, just not 100% herself. This morning she has not been up much.   Denies injuries.     Assessment: PCP input on next steps.  ADS possibly    Protocol Recommended Disposition:   Discuss With PCP And Callback By Nurse Within 1 Hour    Recommendation: PCP input on next steps.      Routed to provider    Does the patient meet one of the following criteria for ADS visit consideration? 16+ years old, with an [facility] PCP     TIP  Providers, please consider if this condition is appropriate for management at one of our Acute and Diagnostic Services sites.     If patient is a good candidate, please use dotphrase <dot>triageresponse and select Refer to ADS to document.          Reason for Disposition   Nursing judgment    Protocols used: No Protocol [id number]-A-[id number]

## 2025-05-29 NOTE — TELEPHONE ENCOUNTER
ADS would be fine.  Also Urgent Care would probably be Ok if her symptoms haven't worsened over the past several hours.

## 2025-05-29 NOTE — TELEPHONE ENCOUNTER
Returned call to patient, relayed clinician recommendation. Patient reports symptoms are stable. She prefers ADS referral in case imaging is needed.  Patient lives in [place], prefers [place] location.   Reviewed after hours ADS referral process with patient.   Provided [place] location phone number, instructed patient to call if she has not been contacted by [phone].    Referral to Acute and Diagnostic Services    [phone] ([place]) [place] [address] [facility], [address], [place] [address]    Transition to Acute & Diagnostic Services Clinic has been discussed with patient, and she agrees with next level of care.   Patient understands that evaluation/treatment at [facility] typically takes significantly longer than in clinic/urgent care (>2 hours).  The [facility]ostics Services Clinic has been contacted by provider/staff to confirm patient acceptance.         Special issues:     None                     The following provider has assessed this patient for intervention at [facility], and directed the patient for referral: Fidelia South PA-C

## 2025-06-02 ENCOUNTER — PATIENT OUTREACH (OUTPATIENT)
Dept: CARE COORDINATION | Facility: CLINIC | Age: 55
End: 2025-06-02
Payer: COMMERCIAL

## 2025-06-03 ENCOUNTER — VIRTUAL VISIT (OUTPATIENT)
Dept: FAMILY MEDICINE | Facility: CLINIC | Age: 55
End: 2025-06-03
Payer: COMMERCIAL

## 2025-06-03 DIAGNOSIS — G44.209 TENSION HEADACHE: ICD-10-CM

## 2025-06-03 DIAGNOSIS — M54.2 NECK PAIN: Primary | ICD-10-CM

## 2025-06-03 DIAGNOSIS — I77.74 VERTEBRAL ARTERY DISSECTION: Chronic | ICD-10-CM

## 2025-06-03 DIAGNOSIS — F41.1 GAD (GENERALIZED ANXIETY DISORDER): ICD-10-CM

## 2025-06-03 PROCEDURE — 98006 SYNCH AUDIO-VIDEO EST MOD 30: CPT | Performed by: PHYSICIAN ASSISTANT

## 2025-06-03 RX ORDER — CYCLOBENZAPRINE HCL 5 MG
5 TABLET ORAL 2 TIMES DAILY PRN
Qty: 20 TABLET | Refills: 1 | Status: SHIPPED | OUTPATIENT
Start: 2025-06-03

## 2025-06-03 RX ORDER — CLONAZEPAM 0.5 MG/1
.5-1 TABLET ORAL 2 TIMES DAILY PRN
Qty: 20 TABLET | Refills: 0 | Status: SHIPPED | OUTPATIENT
Start: 2025-06-03

## 2025-06-03 NOTE — PROGRESS NOTES
Kenton is a 54 year old who is being evaluated via a billable video visit.          Assessment & Plan     -will send for additional imaging  -discussed Flexeril PRN for neck spasm/tension headache  -for anxiety she would like to avoid daily medication.  Discussed using propranolol for anxiety but worries about already low BP.  Will trial klonopin 0.5mg PRN anxiety.  Side effects of medication(s) discussed as well as risks/benefits of taking    -discussed alarm signs and symptoms to monitor for and discussed when to be reevaluated in the UC or ED     Neck pain  - CTA Head Neck with Contrast; Future  - cyclobenzaprine (FLEXERIL) 5 MG tablet; Take 1 tablet (5 mg) by mouth 2 times daily as needed for muscle spasms.    Tension headache  - CTA Head Neck with Contrast; Future  - cyclobenzaprine (FLEXERIL) 5 MG tablet; Take 1 tablet (5 mg) by mouth 2 times daily as needed for muscle spasms.    Vertebral artery dissection  - CTA Head Neck with Contrast; Future    STEVO (generalized anxiety disorder)  - clonazePAM (KLONOPIN) 0.5 MG tablet; Take 1-2 tablets (0.5-1 mg) by mouth 2 times daily as needed for anxiety.    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  Has tried many selective serotonin reuptake inhibitor/SNRIs without much help  -feeling anxious lately with many events for graduation for her son etc.  She is struggling to complete tasks needed to do due to her anxiety.      History of Present Illness        Reason for visit:  Mucus    She eats 2-3 servings of fruits and vegetables daily.She consumes 1 sweetened beverage(s) daily.She exercises with enough effort to increase her heart rate 10 to 19 minutes per day.  She exercises with enough effort to increase her heart rate 3 or less days per week.   She is taking medications regularly.              Objective           Vitals:  No vitals were obtained today due to virtual visit.    Physical Exam   GENERAL: alert and no distress  EYES: Eyes grossly normal to inspection.  No discharge or erythema, or obvious scleral/conjunctival abnormalities.  RESP: No audible wheeze, cough, or visible cyanosis.    SKIN: Visible skin clear. No significant rash, abnormal pigmentation or lesions.  NEURO: Cranial nerves grossly intact.  Mentation and speech appropriate for age.  PSYCH: Appropriate affect, tone, and pace of words        Video-Visit Details    Type of service:  Video Visit   Video End Time:  Originating Location (pt. Location): Home    Distant Location (provider location):  On-site  Platform used for Video Visit: Sabine  Signed Electronically by: Luisa Garnett PA-C

## 2025-06-21 ENCOUNTER — ANCILLARY PROCEDURE (OUTPATIENT)
Dept: CT IMAGING | Facility: CLINIC | Age: 55
End: 2025-06-21
Attending: PHYSICIAN ASSISTANT
Payer: COMMERCIAL

## 2025-06-21 DIAGNOSIS — I77.74 VERTEBRAL ARTERY DISSECTION: Chronic | ICD-10-CM

## 2025-06-21 DIAGNOSIS — G44.209 TENSION HEADACHE: ICD-10-CM

## 2025-06-21 DIAGNOSIS — M54.2 NECK PAIN: ICD-10-CM

## 2025-06-21 PROCEDURE — 70498 CT ANGIOGRAPHY NECK: CPT | Performed by: RADIOLOGY

## 2025-06-21 PROCEDURE — 70496 CT ANGIOGRAPHY HEAD: CPT | Performed by: RADIOLOGY

## 2025-06-21 RX ORDER — IOPAMIDOL 755 MG/ML
70 INJECTION, SOLUTION INTRAVASCULAR ONCE
Status: COMPLETED | OUTPATIENT
Start: 2025-06-21 | End: 2025-06-21

## 2025-06-21 RX ADMIN — IOPAMIDOL 70 ML: 755 INJECTION, SOLUTION INTRAVASCULAR at 08:29

## 2025-06-21 NOTE — DISCHARGE INSTRUCTIONS

## 2025-06-23 ENCOUNTER — TELEPHONE (OUTPATIENT)
Dept: VASCULAR SURGERY | Facility: CLINIC | Age: 55
End: 2025-06-23
Payer: COMMERCIAL

## 2025-06-23 ENCOUNTER — RESULTS FOLLOW-UP (OUTPATIENT)
Dept: FAMILY MEDICINE | Facility: CLINIC | Age: 55
End: 2025-06-23
Payer: COMMERCIAL

## 2025-06-23 DIAGNOSIS — I77.3 FIBROMUSCULAR DYSPLASIA OF CAROTID ARTERY: Primary | ICD-10-CM

## 2025-06-23 NOTE — TELEPHONE ENCOUNTER
Vascular Referral Intake    Appointment note (to be pasted into appt note. Also add where additional info is located ie: outside images pushed to PACS, in Epic, sent to HIM, etc): Mild beading/irregularity of the mid to distal right internal carotid artery raising suspicion for sequelae of fibromuscular dysplasia.     Referred by Luisa Garnett PA-C for Fibromuscular dysplasia of carotid artery     Specialty: Vascular Medicine    Is there more than One Provider Consult Needed: No (If yes, please list in specific provider and explain under special instructions)    Specific Provider if Necessary:  Dr. Joan Vargas    Visit Type: New    Time Frame: Next Available    Testing/Imaging Needed Before Consult: N/A    Anneliese or bed needed: No    Special Instructions: No    *Schedulers: Please send welcome letter to patient after appointment(s) scheduled*

## 2025-06-24 ENCOUNTER — TELEPHONE (OUTPATIENT)
Dept: VASCULAR SURGERY | Facility: CLINIC | Age: 55
End: 2025-06-24
Payer: COMMERCIAL

## 2025-06-24 NOTE — TELEPHONE ENCOUNTER
Patient confirmed scheduled appointment:     Date: 10/31/25  Time: 10:00 AM  Appt type: New Vascular Patient  Appt mode: in-person  Provider: Dr. Joan Vargas  Location: [facility]  Appt notes: Fibromuscular dysplasia of carotid artery, referred by Luisa Garnett PA-C     Is Imaging Needed: No    Additional Notes:

## 2025-06-25 NOTE — TELEPHONE ENCOUNTER
Patient would like to schedule at [facility] to be seen sooner - please review and advise on scheduling. Route back to scheduling pool.

## 2025-06-25 NOTE — TELEPHONE ENCOUNTER
Referral received via Phone from on 6/25/25.    Referred by Luisa Garnett PA-C for fibromuscular dysplasia of carotid artery    Previous imaging completed (pertinent to referral):  6/21/25 - CTA head neck with contrast    Routing to scheduling to coordinate the following:  NEW VASCULAR PATIENT consult with Vascular Medicine  Please schedule this at next available    Appt note: Referred by Luisa Garnett PA-C for fibromuscular dysplasia of carotid artery    Shayna Vu RN  [facility]  Office: [phone]  Fax: [phone]

## 2025-06-25 NOTE — TELEPHONE ENCOUNTER
Called and spoke with patient. Got her over to the vascular center at [facility]. The  is forwarding her chart over to their nurses and they will call patient back to schedule.

## 2025-06-27 ENCOUNTER — APPOINTMENT (OUTPATIENT)
Dept: MRI IMAGING | Facility: CLINIC | Age: 55
End: 2025-06-27
Payer: COMMERCIAL

## 2025-06-27 ENCOUNTER — HOSPITAL ENCOUNTER (EMERGENCY)
Facility: CLINIC | Age: 55
Discharge: HOME | End: 2025-06-27
Attending: EMERGENCY MEDICINE | Admitting: EMERGENCY MEDICINE
Payer: COMMERCIAL

## 2025-06-27 VITALS
OXYGEN SATURATION: 99 % | HEART RATE: 79 BPM | SYSTOLIC BLOOD PRESSURE: 133 MMHG | RESPIRATION RATE: 16 BRPM | TEMPERATURE: 98 F | DIASTOLIC BLOOD PRESSURE: 74 MMHG

## 2025-06-27 DIAGNOSIS — Z86.79 HISTORY OF ARTERIAL DISSECTION: ICD-10-CM

## 2025-06-27 DIAGNOSIS — R20.2 PARESTHESIA OF LEFT ARM: ICD-10-CM

## 2025-06-27 DIAGNOSIS — R93.89 ABNORMAL ANGIOGRAM OF VESSELS OF NECK: ICD-10-CM

## 2025-06-27 LAB
ANION GAP SERPL CALCULATED.3IONS-SCNC: 13 MMOL/L (ref 7–15)
ATRIAL RATE - MUSE: 77 BPM
BASOPHILS # BLD AUTO: 0.1 10E3/UL (ref 0–0.2)
BASOPHILS NFR BLD AUTO: 1 %
BUN SERPL-MCNC: 9 MG/DL (ref 6–20)
CALCIUM SERPL-MCNC: 9.5 MG/DL (ref 8.8–10.4)
CHLORIDE SERPL-SCNC: 109 MMOL/L (ref 98–107)
CREAT SERPL-MCNC: 0.63 MG/DL (ref 0.51–0.95)
DIASTOLIC BLOOD PRESSURE - MUSE: NORMAL MMHG
EGFRCR SERPLBLD CKD-EPI 2021: >90 ML/MIN/1.73M2
EOSINOPHIL # BLD AUTO: 0.2 10E3/UL (ref 0–0.7)
EOSINOPHIL NFR BLD AUTO: 4 %
ERYTHROCYTE [DISTWIDTH] IN BLOOD BY AUTOMATED COUNT: 12.3 % (ref 10–15)
GLUCOSE SERPL-MCNC: 109 MG/DL (ref 70–99)
HCO3 SERPL-SCNC: 19 MMOL/L (ref 22–29)
HCT VFR BLD AUTO: 45.3 % (ref 35–47)
HGB BLD-MCNC: 14.5 G/DL (ref 11.7–15.7)
IMM GRANULOCYTES # BLD: 0 10E3/UL
IMM GRANULOCYTES NFR BLD: 1 %
INTERPRETATION ECG - MUSE: NORMAL
LYMPHOCYTES # BLD AUTO: 1.6 10E3/UL (ref 0.8–5.3)
LYMPHOCYTES NFR BLD AUTO: 26 %
MCH RBC QN AUTO: 33 PG (ref 26.5–33)
MCHC RBC AUTO-ENTMCNC: 32 G/DL (ref 31.5–36.5)
MCV RBC AUTO: 103 FL (ref 78–100)
MONOCYTES # BLD AUTO: 0.5 10E3/UL (ref 0–1.3)
MONOCYTES NFR BLD AUTO: 8 %
NEUTROPHILS # BLD AUTO: 3.7 10E3/UL (ref 1.6–8.3)
NEUTROPHILS NFR BLD AUTO: 61 %
NRBC # BLD AUTO: 0 10E3/UL
NRBC BLD AUTO-RTO: 0 /100
P AXIS - MUSE: 81 DEGREES
PLATELET # BLD AUTO: 349 10E3/UL (ref 150–450)
POTASSIUM SERPL-SCNC: 4.4 MMOL/L (ref 3.4–5.3)
PR INTERVAL - MUSE: 164 MS
QRS DURATION - MUSE: 72 MS
QT - MUSE: 366 MS
QTC - MUSE: 414 MS
R AXIS - MUSE: 84 DEGREES
RBC # BLD AUTO: 4.4 10E6/UL (ref 3.8–5.2)
SODIUM SERPL-SCNC: 141 MMOL/L (ref 135–145)
SYSTOLIC BLOOD PRESSURE - MUSE: NORMAL MMHG
T AXIS - MUSE: 71 DEGREES
TROPONIN T SERPL HS-MCNC: 7 NG/L
TROPONIN T SERPL HS-MCNC: 8 NG/L
VENTRICULAR RATE- MUSE: 77 BPM
WBC # BLD AUTO: 6.1 10E3/UL (ref 4–11)

## 2025-06-27 PROCEDURE — 85004 AUTOMATED DIFF WBC COUNT: CPT | Performed by: EMERGENCY MEDICINE

## 2025-06-27 PROCEDURE — 84484 ASSAY OF TROPONIN QUANT: CPT | Performed by: EMERGENCY MEDICINE

## 2025-06-27 PROCEDURE — 36415 COLL VENOUS BLD VENIPUNCTURE: CPT | Performed by: EMERGENCY MEDICINE

## 2025-06-27 PROCEDURE — 250N000011 HC RX IP 250 OP 636: Performed by: EMERGENCY MEDICINE

## 2025-06-27 PROCEDURE — A9585 GADOBUTROL INJECTION: HCPCS

## 2025-06-27 PROCEDURE — 70553 MRI BRAIN STEM W/O & W/DYE: CPT

## 2025-06-27 PROCEDURE — 99285 EMERGENCY DEPT VISIT HI MDM: CPT | Mod: 25

## 2025-06-27 PROCEDURE — 255N000002 HC RX 255 OP 636

## 2025-06-27 PROCEDURE — 96374 THER/PROPH/DIAG INJ IV PUSH: CPT | Mod: 59

## 2025-06-27 PROCEDURE — 70544 MR ANGIOGRAPHY HEAD W/O DYE: CPT

## 2025-06-27 PROCEDURE — 93005 ELECTROCARDIOGRAM TRACING: CPT

## 2025-06-27 PROCEDURE — 72156 MRI NECK SPINE W/O & W/DYE: CPT

## 2025-06-27 PROCEDURE — 80048 BASIC METABOLIC PNL TOTAL CA: CPT | Performed by: EMERGENCY MEDICINE

## 2025-06-27 PROCEDURE — 70549 MR ANGIOGRAPH NECK W/O&W/DYE: CPT

## 2025-06-27 PROCEDURE — 99207 PR NO CHARGE LOS: CPT

## 2025-06-27 RX ORDER — GADOBUTROL 604.72 MG/ML
10 INJECTION INTRAVENOUS ONCE
Status: COMPLETED | OUTPATIENT
Start: 2025-06-27 | End: 2025-06-27

## 2025-06-27 RX ADMIN — GADOBUTROL 10 ML: 604.72 INJECTION INTRAVENOUS at 13:11

## 2025-06-27 RX ADMIN — MIDAZOLAM 1 MG: 1 INJECTION INTRAMUSCULAR; INTRAVENOUS at 11:54

## 2025-06-27 ASSESSMENT — ACTIVITIES OF DAILY LIVING (ADL)
ADLS_ACUITY_SCORE: 41

## 2025-06-27 ASSESSMENT — COLUMBIA-SUICIDE SEVERITY RATING SCALE - C-SSRS
6. HAVE YOU EVER DONE ANYTHING, STARTED TO DO ANYTHING, OR PREPARED TO DO ANYTHING TO END YOUR LIFE?: NO
1. IN THE PAST MONTH, HAVE YOU WISHED YOU WERE DEAD OR WISHED YOU COULD GO TO SLEEP AND NOT WAKE UP?: NO
2. HAVE YOU ACTUALLY HAD ANY THOUGHTS OF KILLING YOURSELF IN THE PAST MONTH?: NO

## 2025-06-27 NOTE — ED TRIAGE NOTES
"H/A for 3 weeks, woke up today with left arm \"asleep\" that has improved, hx vertebral artery dissection. CT done last week        "

## 2025-06-27 NOTE — DISCHARGE INSTRUCTIONS
The cause of your arm symptoms today is unclear, but fortunately, there are no signs of stroke, tumor, or new blood vessel problem in your head or neck.  The Vascular Neurology team does not recommend any new medications at this time.  We recommend that she follow-up with a general neurologist, and also keep the appointment you already have with vascular medicine.  Return to the emergency department for acute worsening at any hour.

## 2025-06-27 NOTE — CONSULTS
"  [facility]    Stroke Telephone Note    I was called by Manfred Duarte on 06/27/25 regarding patient Kenton Mathew. The patient is a 55 year old female with PMHx significant for STEVO and spontaneous vertebral artery dissection (2001; unable to access records) presenting to the ED with intermittent shooting headaches for 3 weeks and now, new LUE sensory disturbance/aching beginning at 0545. Symptoms improving while in the ED with no focal deficits on examination. She is able to ambulate without difficulty. No PTA anticoagulation.     On chart review, PCP visit from 6/3 for neck pain and tension headaches treated with Flexeril. Most recent vessel imaging on 6/21 CTA head/neck revealed mild beading of distal R ICA concerning for fibromuscular dysplasia.   Vitals  BP: 126/75   Pulse: 93   Resp: 16   Temp: 98  F (36.7  C)        Imaging Findings  pending    Impression  Headache with new LUE intermittent sensory disturbances, unclear etiology. Concern for neurovascular etiology vs potential migrainous phenomenon. Workup currently pending.     Recommendations  STAT imaging including:  - MRI brain w and w/o contrast (ordered)  - MR C-Spine w and w/o contrast (ordered)  - MRA COW w/o contrast with t1 fat sat dissection protocol (ordered)  - MRA Neck w contrast with t1 fat sat dissection protocol (ordered)  - Symptomatic treatment of headache    Case discussed with vascular neurology attending Dr. Echavarria.    My recommendations are based on the information provided over the phone by Kenton Mathew's in-person providers. They are not intended to replace the clinical judgment of her in-person providers. I was not requested to personally see or examine the patient at this time.     Aleyda Farfan PA-C  Vascular Neurology    To page me or covering stroke neurology team member, click here: AMCOM  Choose \"On Call\" tab at top, then select \"NEUROLOGY/ALL SITES\" from middle drop-down box, press Enter, then " "look for \"stroke\" or \"telestroke\" for your site.   "

## 2025-06-27 NOTE — PROGRESS NOTES
"Brief Stroke Note    MR imaging with no acute intracranial process to explain presenting symptoms. Per ED provider, patient is feeling better and okay to discharge home.     Recommend outpatient general neurology referral and patient to keep a headache diary leading to appointment. Follow with PCP in 1-2 weeks.     No further stroke workup is recommended, we will sign off. Please contact us for additional questions or concerns.     Aleyda Farfan PA-C  Vascular Neurology    To page me or covering stroke neurology team member, click here: AMCOM  Choose \"On Call\" tab at top, then select \"NEUROLOGY/ALL SITES\" from middle drop-down box, press Enter, then look for \"stroke\" or \"telestroke\" for your site.    "

## 2025-06-27 NOTE — ED PROVIDER NOTES
Emergency Department Note      History of Present Illness     Chief Complaint:  L arm symptoms    HPI   Kenton Mathew is a 55 year old female with a history of spontaneous vertebral artery dissection in 2002 who is not on blood thinners, recently found out about a diagnosis of fibromuscular dysplasia who presents to the emergency department for evaluation of 3 weeks of shooting discomfort in her head and neck, more left-sided than right, and then she woke up at 545 this morning, not unusual hour for her, with 10 minutes of numbness throughout her left arm, it felt asleep though she denies having slept on it in any unusual position.  No weakness.  She states now the numbness has gone but she has a mild achiness throughout her entire left arm.  No chest pain.  No syncope.  No symptoms in her right arm or her legs.  No fevers.  No vomiting.  She is worried that she might be having a stroke, also would like to have her heart checked out.  She got a ride to the emergency department.  She has a child who just graduated high school and another who is a sophomore in college.  Also volunteers a history of fibromyalgia and anxiety.  No confusion, no speech change, no vision change.    Review of External Notes: I personally reviewed prior records including CT of the head which was benign on May 29, also recent CT angiography of head and neck showing beading of the right internal carotid artery.    Past Medical History     Medical History and Problem List   Past Medical History:   Diagnosis Date    Celiac disease     Cerebral infarction (H) 4/23/2002    Fibromyalgia     Vertebral artery dissection 01/01/2001       Medications   calcium carbonate-vitamin D (OSCAL) 500-5 MG-MCG tablet  clonazePAM (KLONOPIN) 0.5 MG tablet  cyclobenzaprine (FLEXERIL) 5 MG tablet  estradiol (CLIMARA) 0.0375 MG/24HR weekly patch  estradiol (CLIMARA) 0.06 MG/24HR weekly patch  magnesium 250 MG tablet  metroNIDAZOLE (METROCREAM) 0.75 % external  cream  Multiple Vitamins-Iron (MULTIVITAMIN/IRON PO)  multivitamin w/minerals (MULTI-VITAMIN) tablet  progesterone (PROMETRIUM) 200 MG capsule  tacrolimus (PROTOPIC) 0.03 % external ointment  TRAZODONE HCL PO  VITAMIN D, CHOLECALCIFEROL, PO  vitamin D3 (CHOLECALCIFEROL) 50 mcg (2000 units) tablet      Surgical History   Past Surgical History:   Procedure Laterality Date    APPENDECTOMY      BIOPSY  2017    rash on shins     SECTION      x 2     Physical Exam     Patient Vitals for the past 24 hrs:   BP Temp Temp src Pulse Resp SpO2   25 1347 133/74 -- -- -- 16 99 %   25 1340 -- -- -- -- -- 99 %   25 1200 103/81 -- -- 79 -- 99 %   25 0838 126/75 98  F (36.7  C) Temporal 93 16 98 %     Physical Exam  General: Nontoxic-appearing woman sitting upright in room 25  HENT: mucous membranes moist, OP clear, TMs clear, face nontender  Eyes: pupils normal without nystagmus, no photophobia, EOMI, no scleral injection  CV: regular rate as above, regular rhythm, no murmur, normal bilateral radial pulses, compartment soft in all extremities including the left upper extremity, no lower extremity edema  Resp: normal effort, speaks in full phrases, no stridor, no cough observed  GI: abdomen soft and nontender  MSK: no bony tenderness to face, skull, or cervical spine  Skin: appropriately warm and dry, no petechiae, no vesicles  Neuro: awake, alert, clear speech, fully oriented, face symmetric, sensation symmetric throughout all portions of face,  normal, finger-nose normal bilaterally, strength and sensation intact in all extr including LUE, proximal and distal strength excellent and symmetric in both arms, no nuchal rigidity, ambulation not initially tested  Psych: cooperative, anxious, no hallucinations    Diagnostics   Electrocardiogram  ECG taken at 0939, ECG interpreted at 0940 by MONICA Duarte MD  Sinus rhythm, no ST elevation  Rate 77 bpm. HI interval 164. QRS duration 72. QTc 414    Lab  Results   Labs Ordered and Resulted from Time of ED Arrival to Time of ED Departure   BASIC METABOLIC PANEL - Abnormal       Result Value    Sodium 141      Potassium 4.4      Chloride 109 (*)     Carbon Dioxide (CO2) 19 (*)     Anion Gap 13      Urea Nitrogen 9.0      Creatinine 0.63      GFR Estimate >90      Calcium 9.5      Glucose 109 (*)    CBC WITH PLATELETS AND DIFFERENTIAL - Abnormal    WBC Count 6.1      RBC Count 4.40      Hemoglobin 14.5      Hematocrit 45.3       (*)     MCH 33.0      MCHC 32.0      RDW 12.3      Platelet Count 349      % Neutrophils 61      % Lymphocytes 26      % Monocytes 8      % Eosinophils 4      % Basophils 1      % Immature Granulocytes 1      NRBCs per 100 WBC 0      Absolute Neutrophils 3.7      Absolute Lymphocytes 1.6      Absolute Monocytes 0.5      Absolute Eosinophils 0.2      Absolute Basophils 0.1      Absolute Immature Granulocytes 0.0      Absolute NRBCs 0.0     TROPONIN T, HIGH SENSITIVITY - Normal    Troponin T, High Sensitivity 8     TROPONIN T, HIGH SENSITIVITY - Normal    Troponin T, High Sensitivity 7       Imaging   MR Brain w/o & w Contrast   Final Result   IMPRESSION:   1.  Unremarkable MRI evaluation of the brain without and with contrast.      MRA Brain ([place] [facility] [place]) w/o Contrast   Final Result   IMPRESSION:   1.  No large vessel occlusion. No high-grade stenosis intracranially.      MRA Neck ([place]) wo & w Contrast   Final Result   IMPRESSION:   1.  No significant stenosis or acute vascular injury.      2.  Mild beaded appearance to the mid to distal right cervical ICA in the neck is better delineated on the CTA head and neck from 6/21/2025.      MR Cervical Spine w/o & w Contrast   Final Result   IMPRESSION:   1.  Unremarkable MR evaluation of the cervical spine without and with contrast. No cord signal abnormality. No abnormal enhancement.           ED Course      Medications Administered   Medications   midazolam (VERSED) injection 1 mg  (1 mg Intravenous $Given 6/27/25 1154)   gadobutrol (GADAVIST) injection 10 mL (10 mLs Intravenous $Given 6/27/25 1311)       ED Course and Discussion of Management   ED Course as of 06/27/25 1453   Fri Jun 27, 2025   0935   I rechecked patient, updated her on the wait for stroke neurology input.   1001 I spoke with NICOLE, vascular neurology has been repaged.   1018 I spoke with Stroke Neurology BLANE Farfan, she will discuss with attending and contact me with imaging recommendations.   1030 I spoke with BLANE Farfan again, she has ordered MRIs.   1031 I updated patient on plan for MRIs.   1352 I updated patient and RN on MRI results.   1402 I communicated with BLANE Farfan with Stroke Neuro again, she reviewed images and recommended discharge home and follow up with General Neurology.     Additional Documentation  None    MIPS   None             Medical Decision Making / Diagnosis   Medical Decision Making:  Patient presents with left arm symptoms of unclear etiology, patient was understandably concerned in light of her prior spontaneous vertebral artery dissection, consideration given to whether she might have been experiencing a recurrent vascular dissection or even a large vessel occlusion, also consider the possibilities of subarachnoid hemorrhage or intracranial tumor among other diseases on the differential diagnosis.  I am grateful for the assistance of the stroke/vascular neurology team, who recommended proceeding directly to advanced imaging with MRI and angiography which they ordered, performed in the emergency department with reassuring results as above.  The right carotid abnormality was previously seen on CT angiography a number of weeks ago, thought to be incidental to today symptoms.  The neurology team does not feel that she requires hospitalization or any immediate medication changes based on current findings.  Regard to her arm, no weakness, she does not have signs of DVT or arterial compromise in her arm,  and my suspicion for atypical presentation of ACS is very low, serial troponins are negative.  No signs of infection such as cellulitis or abscess in her arm.  Diagnostic uncertainty as well as reassurances of today's workup were reviewed with the patient.  I recommended that she keep her appointment already scheduled with vascular medicine and gave referral information for general neurology as well.  Return here for sudden worsening at any hour.    Disposition   Discharge    Diagnosis     ICD-10-CM    1. Paresthesia of left arm  R20.2       2. History of arterial dissection  Z86.79       3. Abnormal angiogram of vessels of neck  R93.89     pre-existing R carotid abnormality         6/27/2025   MD Gerald Bardales, Manfred Stafford MD  06/27/25 1459

## 2025-06-30 ENCOUNTER — PATIENT OUTREACH (OUTPATIENT)
Dept: CARE COORDINATION | Facility: CLINIC | Age: 55
End: 2025-06-30
Payer: COMMERCIAL

## 2025-06-30 NOTE — LETTER
Kenton Mathew  [address] [id number]    Dear Kenton Mathew,      I am a team member within the [facility] with [facility]. I recently spoke to you to ensure you were doing well following a recent visit within our health system. I also wanted to take this chance to introduce Clinic Care Coordination.     Below is a description of Clinic Care Coordination and how this team can further assist you:       The Clinic Care Coordination team is made up of a Registered Nurse, , Financial Resource Worker, and a Community Health Worker who understand and can help navigate the health care system. The goal of clinic care coordination is to help you manage your health, improve access to care, and achieve optimal health outcomes. They work alongside your provider to assist you in determining your health and social needs, obtain health care and community resources, and provide you with necessary information and education. Clinic Care Coordination can work with you through any barriers and develop a care plan that helps coordinate and strengthen the relationship between you and your care team.    If you wish to connect with the Clinic Care Coordination Team, please let your [facility] Primary Care Provider or Clinic Care Team know and they can place a referral. The Clinic Care Coordination team will then reach out by phone to further support you.    We are focused on providing you with the highest-quality healthcare experience possible.    Sincerely,   Your care team with [facility]'s [address] [place] [address] ([phone]).    Aisha Moya, [facility]  Care Coordination

## 2025-07-03 ENCOUNTER — RESULTS FOLLOW-UP (OUTPATIENT)
Dept: FAMILY MEDICINE | Facility: CLINIC | Age: 55
End: 2025-07-03

## 2025-07-03 ENCOUNTER — TELEPHONE (OUTPATIENT)
Dept: FAMILY MEDICINE | Facility: CLINIC | Age: 55
End: 2025-07-03

## 2025-07-03 ENCOUNTER — OFFICE VISIT (OUTPATIENT)
Dept: FAMILY MEDICINE | Facility: CLINIC | Age: 55
End: 2025-07-03
Payer: COMMERCIAL

## 2025-07-03 VITALS
SYSTOLIC BLOOD PRESSURE: 115 MMHG | OXYGEN SATURATION: 98 % | HEART RATE: 76 BPM | TEMPERATURE: 97.9 F | RESPIRATION RATE: 18 BRPM | BODY MASS INDEX: 22.13 KG/M2 | DIASTOLIC BLOOD PRESSURE: 68 MMHG | WEIGHT: 133 LBS

## 2025-07-03 DIAGNOSIS — I77.3 FIBROMUSCULAR DYSPLASIA OF CAROTID ARTERY: Primary | ICD-10-CM

## 2025-07-03 NOTE — TELEPHONE ENCOUNTER
"S-(situation): Followed by MONICA Garnett PA-C for ongoing neck pain/stiffness. Concerned about new symptoms, requesting additional imaging orders.    B-(background):   Discussed symptoms with PCP 6/3/25 virtual visit  CTA head 6/21/25 showed mild beading/irregularity of the mid to distal right internal carotid artery raising suspicion for sequelae of fibromuscular dysplasia.  Noted on 6/27 MRA neck as well.  6/27 MR c-spine, brain non-significant.  Has vascular consult scheduled 7/17/25    A-(assessment):   Numerous new symptoms this week  Aching in arms and legs  Generalized stiffness and soreness  Lightheadedness  Hip pain  Historically occurs when patient sleeps on sides  Now constant symptom  Continued head and neck pain  Symptoms worsen with standing, ambulation    R-(recommendations):   Recommended re-evaluation for new symptoms  Suggested UC or ED as no appointments at [facility]  Patient declined -  \"I sat in the emergency room for like 6 hours just to get initial CT scans. Luisa was able to just schedule me for imaging - can't we do that again?\"  Discussed that PCP is not in clinic today, clinic will be closed 7/4 until 7/7 so no certainty on response today.  Offered to send encounter to alternate provider  Dr. Reza had cancellation today while writer was discussing options with patient, patient scheduled at [facility] 7/3/25 at 1330.  Discussed red flag symptoms indicating patient should proceed to ED.    Patient verbalizes understanding and agreement with plan.    Tessa Maldonado RN  [facility]    "

## 2025-07-03 NOTE — PROGRESS NOTES
"  Assessment & Plan     Fibromuscular dysplasia of carotid artery  Weeks to months of headache, neck pain, lightheadedness - constant, now with new \"heavy\", \"tingly\", \"achy\" feeling in bilateral arms and legs, equally in all limbs. Some epigastric pain as well which is new, doesn't feel like reflux. Severe fatigue. No other new neurologic symptoms. Recent visits/ER where she was diagnosed with likely fibromuscular dysplasia based on the appearance of the carotid artery (beads on a string) - she is seeing neurology next week, and vascular on 7/17. Her main concern today is blood flow to the rest of her body, worried that she could have vascular abnormalities in her chest, abdomen, or pelvis. CTA was completed tonight (before the writing of this note) and was reassuringly normal. Will continue to monitor symptoms until specialist appointments next week. She is taking a baby aspirin.   - CTA Chest Abdomen Pelvis w Contrast    I spent a total of 33 minutes on the day of the visit.   Time was spent reviewing tests and other records, examining and counseling the patient, ordering medications/tests/procedures, coordinating care, and documenting this visit.      The longitudinal plan of care for the diagnosis(es)/condition(s) as documented were addressed during this visit. Due to the added complexity in care, I will continue to support Kenton in the subsequent management and with ongoing continuity of care.    Ronit DALY [facility]      Subjective   Kenton is a 55 year old, presenting for the following health issues:  Head and neck pain, Lightheadache, and Limbs pain        7/3/2025     1:16 PM   Additional Questions   Roomed by Elodia DALY   Accompanied by self     History of Present Illness       Reason for visit:  Head and neck pain, lighthead, achy limbs    She eats 2-3 servings of fruits and vegetables daily.She consumes 0 sweetened beverage(s) daily.She exercises with enough effort to increase " "her heart rate 30 to 60 minutes per day.  She exercises with enough effort to increase her heart rate 7 days per week.   She is taking medications regularly.        Head and neck pain, lightheadedness - still there  Now her arms and legs are achy, almost like they are \"falling asleep\", skin feels cool  Pain in upper stomach - off and on for a few months - no history of heartburn, no pain going up her chest, no change with eating  Hx celiac disease - asymptomatic but follows strict gluten-free diet  Left arm was asleep when she went to the ED on 6/27 - at one point was having bright yellow liquid coming out of her nose - concern for CSF??  Hx vertebral artery dissection 20 years ago  Diagnosed with fibromuscular dysplasia of carotid artery  Will have pain in upper stomach off and on  Severe fatigue - worsening a lot over the past few months  No syncope recently, no falls  No difference in symptoms with walking around/exercise  No slurring of speech or trouble swallowing  Balance not great at times - getting worse along with other symptoms    Seeing vascular on 7/17, Neurology is next Thursday (7/10) at [facility]    Sneezing off and on, no cough/runny nose, no fevers, had chills a few weeks ago    Has been taking 81 mg aspirin        Objective    /68 (BP Location: Left arm, Patient Position: Sitting, Cuff Size: Adult Regular)   Pulse 76   Temp 97.9  F (36.6  C) (Oral)   Resp 18   Wt 60.3 kg (133 lb)   LMP 10/01/2022   SpO2 98%   BMI 22.13 kg/m    Body mass index is 22.13 kg/m .  Physical Exam   GEN: Patient sitting comfortably in no acute distress.  HEEN: Head is atraumatic, normocephalic, eyes anicteric, mucous membranes moist.  CV: Extremities well-perfused. No obvious lower extremity edema.  PULM: Breathing comfortably on room air. Speaking in full sentences.  NEURO: Alert and oriented x3.  No focal motor abnormalities.  Face symmetric.  PSYCH: Appropriate affect.  SKIN: No " rashes, bruising, or other lesions visible on exposed skin.          Signed Electronically by: Ronit Reza MD

## 2025-07-03 NOTE — PROGRESS NOTES
Prior to immunization administration, verified patients identity using patient s name and date of birth. Please see Immunization Activity for additional information.     Screening Questionnaire for Adult Immunization      Are you sick today?   No   Do you have allergies to medications, food, a vaccine component or latex?   Yes   Have you ever had a serious reaction after receiving a vaccination?   No   Do you have a long-term health problem with heart, lung, kidney, or metabolic disease (e.g., diabetes), asthma, a blood disorder, no spleen, complement component deficiency, a cochlear implant, or a spinal fluid leak?  Are you on long-term aspirin therapy?   No   Do you have cancer, leukemia, HIV/AIDS, or any other immune system problem?   No   Do you have a parent, brother, or sister with an immune system problem?   No   In the past 3 months, have you taken medications that affect  your immune system, such as prednisone, other steroids, or anticancer drugs; drugs for the treatment of rheumatoid arthritis, Crohn s disease, or psoriasis; or have you had radiation treatments?   No   Have you had a seizure, or a brain or other nervous system problem?   No   During the past year, have you received a transfusion of blood or blood    products, or been given immune (gamma) globulin or antiviral drug?   No   For women: Are you pregnant or is there a chance you could become       pregnant during the next month?   No   Have you received any vaccinations in the past 4 weeks?   No     Immunization questionnaire was positive for at least one answer.  Notified provider.      Patient instructed to remain in clinic for 15 minutes afterwards, and to report any adverse reactions.     Screening performed by Elodia Reese MA on 7/3/2025 at 1:19 PM.        Answers submitted by the patient for this visit:  General Questionnaire (Submitted on 7/3/2025)  Chief Complaint: Chronic problems general questions HPI Form  What is the reason for your  visit today? : head and neck pain, lighthead, achy limbs  How many servings of fruits and vegetables do you eat daily?: 2-3  On average, how many sweetened beverages do you drink each day (Examples: soda, juice, sweet tea, etc.  Do NOT count diet or artificially sweetened beverages)?: 0  How many minutes a day do you exercise enough to make your heart beat faster?: 30 to 60  How many days a week do you exercise enough to make your heart beat faster?: 7  How many days per week do you miss taking your medication?: 0  Questionnaire about: Chronic problems general questions HPI Form (Submitted on 7/3/2025)  Chief Complaint: Chronic problems general questions HPI Form

## 2025-07-06 DIAGNOSIS — Z78.0 POSTMENOPAUSAL STATUS: ICD-10-CM

## 2025-07-06 DIAGNOSIS — F41.1 GAD (GENERALIZED ANXIETY DISORDER): ICD-10-CM

## 2025-07-06 RX ORDER — PROGESTERONE 200 MG/1
200 CAPSULE ORAL DAILY
Qty: 90 CAPSULE | Refills: 3 | Status: SHIPPED | OUTPATIENT
Start: 2025-07-06

## 2025-07-07 RX ORDER — CLONAZEPAM 0.5 MG/1
TABLET ORAL
Qty: 20 TABLET | Refills: 0 | Status: SHIPPED | OUTPATIENT
Start: 2025-07-07

## 2025-07-19 ENCOUNTER — MYC MEDICAL ADVICE (OUTPATIENT)
Dept: FAMILY MEDICINE | Facility: CLINIC | Age: 55
End: 2025-07-19
Payer: COMMERCIAL

## 2025-07-19 DIAGNOSIS — D75.89 MACROCYTOSIS WITHOUT ANEMIA: ICD-10-CM

## 2025-07-19 DIAGNOSIS — R53.83 OTHER FATIGUE: Primary | ICD-10-CM

## 2025-07-23 NOTE — TELEPHONE ENCOUNTER
"Luisa- please review and advise MyChart message    Patient is requesting \"plasma homocysteine and/or methylmalonic acid tests\" d/t patient reported concern for pernicious anemia/ similar symptoms.     Stacey Smith RN  [facility]    "

## 2025-07-24 NOTE — TELEPHONE ENCOUNTER
"Luisa - see MyChart and advise    pt requesting additional testing for her upcoming lab visit (\"anti-intrinsic factor tests\"). See MyChart from 7/24 for additional info.    Stacey Smith RN on 7/24/2025 at 2:07 PM    "

## 2025-07-29 ENCOUNTER — VIRTUAL VISIT (OUTPATIENT)
Dept: FAMILY MEDICINE | Facility: CLINIC | Age: 55
End: 2025-07-29
Payer: COMMERCIAL

## 2025-07-29 ENCOUNTER — LAB (OUTPATIENT)
Dept: LAB | Facility: CLINIC | Age: 55
End: 2025-07-29
Payer: COMMERCIAL

## 2025-07-29 DIAGNOSIS — D75.89 MACROCYTOSIS WITHOUT ANEMIA: ICD-10-CM

## 2025-07-29 DIAGNOSIS — R53.82 CHRONIC FATIGUE: Primary | ICD-10-CM

## 2025-07-29 DIAGNOSIS — R53.83 OTHER FATIGUE: ICD-10-CM

## 2025-07-29 PROCEDURE — 86340 INTRINSIC FACTOR ANTIBODY: CPT | Mod: 90

## 2025-07-29 PROCEDURE — 99000 SPECIMEN HANDLING OFFICE-LAB: CPT

## 2025-07-29 PROCEDURE — 98006 SYNCH AUDIO-VIDEO EST MOD 30: CPT | Performed by: PHYSICIAN ASSISTANT

## 2025-07-29 PROCEDURE — 86364 TISS TRNSGLTMNASE EA IG CLAS: CPT

## 2025-07-29 PROCEDURE — 36415 COLL VENOUS BLD VENIPUNCTURE: CPT

## 2025-07-29 RX ORDER — CYANOCOBALAMIN 1000 UG/ML
1000 INJECTION, SOLUTION INTRAMUSCULAR; SUBCUTANEOUS DAILY
Status: DISCONTINUED | OUTPATIENT
Start: 2025-07-30 | End: 2025-07-30

## 2025-07-29 RX ORDER — CYANOCOBALAMIN 1000 UG/ML
1000 INJECTION, SOLUTION INTRAMUSCULAR; SUBCUTANEOUS EVERY OTHER DAY
Status: DISCONTINUED | OUTPATIENT
Start: 2025-08-06 | End: 2025-07-30

## 2025-07-29 RX ORDER — CYANOCOBALAMIN 1000 UG/ML
1000 INJECTION, SOLUTION INTRAMUSCULAR; SUBCUTANEOUS
Status: DISCONTINUED | OUTPATIENT
Start: 2025-08-13 | End: 2025-07-30

## 2025-07-29 NOTE — PROGRESS NOTES
"Kenton is a 55 year old who is being evaluated via a billable video visit.    How would you like to obtain your AVS? MyChart  If the video visit is dropped, the invitation should be resent by: Text to cell phone: [phone]  Will anyone else be joining your video visit? No  {If patient encounters technical issues they should call [phone] :392352}    {PROVIDER CHARTING PREFERENCE:766928}    Subjective   Kenton is a 55 year old, presenting for the following health issues:  Fatigue      7/29/2025     4:02 PM   Additional Questions   Roomed by Jami   Accompanied by self     Video Start Time: {video visit start/end time for provider to select:044485}    -ongoing fatigue, worsening in that she has to lay down after even taking the dog for a walk  -feels tired all the time    -wonders if she has pernicious anemia          {SUPERLIST (Optional):262844}  {additonal problems for provider to add (Optional):999550}    {ROS Picklists (Optional):946409}      Objective           Vitals:  No vitals were obtained today due to virtual visit.    Physical Exam   {video visit exam brief selected:135088}    {Diagnostic Test Results (Optional):968458}      Video-Visit Details    Type of service:  Video Visit   Video End Time:{video visit start/end time for provider to select:338197}  Originating Location (pt. Location): {video visit patient location:810564::\"Home\"}  {PROVIDER LOCATION On-site should be selected for visits conducted from your clinic location or adjoining [facility] hospital, academic office, or other nearby [facility] building. Off-site should be selected for all other provider locations, including home:134155}  Distant Location (provider location):  {virtual location provider:935816}  Platform used for Video Visit: {Virtual Visit Platforms:670672::\"kSARIA\"}  Signed Electronically by: Luisa Garnett PA-C  {Email feedback regarding this note to [email or web address]   :767848}   " Mentation and speech appropriate for age.  PSYCH: Appropriate affect, tone, and pace of words        Video-Visit Details    Type of service:  Video Visit   Video End Time:  Originating Location (pt. Location): Home    Distant Location (provider location):  On-site  Platform used for Video Visit: Sabine  Signed Electronically by: Luisa Garnett PA-C

## 2025-07-30 ENCOUNTER — MYC MEDICAL ADVICE (OUTPATIENT)
Dept: FAMILY MEDICINE | Facility: CLINIC | Age: 55
End: 2025-07-30

## 2025-07-30 ENCOUNTER — ALLIED HEALTH/NURSE VISIT (OUTPATIENT)
Dept: FAMILY MEDICINE | Facility: CLINIC | Age: 55
End: 2025-07-30
Payer: COMMERCIAL

## 2025-07-30 DIAGNOSIS — R53.82 CHRONIC FATIGUE: Primary | ICD-10-CM

## 2025-07-30 LAB
TTG IGA SER-ACNC: 0.6 U/ML
TTG IGG SER-ACNC: <0.6 U/ML

## 2025-07-30 PROCEDURE — 99207 PR NO CHARGE NURSE ONLY: CPT

## 2025-07-30 RX ORDER — CYANOCOBALAMIN 1000 UG/ML
1000 INJECTION, SOLUTION INTRAMUSCULAR; SUBCUTANEOUS WEEKLY
Status: ACTIVE | OUTPATIENT
Start: 2025-07-30 | End: 2025-08-13

## 2025-07-30 RX ADMIN — CYANOCOBALAMIN 1000 MCG: 1000 INJECTION, SOLUTION INTRAMUSCULAR; SUBCUTANEOUS at 10:59

## 2025-07-30 NOTE — PROGRESS NOTES
Clinic Administered Medication Documentation      Injectable Medication Documentation    Is there an active order (written within the past 365 days, with administrations remaining, not ) in the chart? Yes.     Patient was given Cyanocobalamin (B-12). Prior to medication administration, verified patient's identity using patient s name and date of birth. Please see MAR and medication order for additional information. Patient instructed to remain in clinic for 15 minutes and report any adverse reaction to staff immediately.    Vial/Syringe: Single dose vial. Was entire vial of medication used? Yes  Was this medication supplied by the patient? No  Is this a medication the patient will need to receive again? Yes. Verified that the patient has refills remaining in their prescription.    Ellis Carroll, DEYA on 2025 at 10:52 AM

## 2025-07-30 NOTE — Clinical Note
Ananth Moran,  I saw this patient for a B12 injection today and saw multiple orders for this patient for repeat B12 injections daily over the next week and a half followed by every other day. Does this patient truly need this much B12 as her previous lab was within a normal range? We don't normally see patients for this many B12 injections consecutively even in an Internal Medicine setting. I'm just looking for further clarification.

## 2025-07-31 LAB — IF BLOCK AB SER QL RIA: NEGATIVE

## 2025-08-06 ENCOUNTER — ALLIED HEALTH/NURSE VISIT (OUTPATIENT)
Dept: FAMILY MEDICINE | Facility: CLINIC | Age: 55
End: 2025-08-06
Payer: COMMERCIAL

## 2025-08-06 DIAGNOSIS — E53.8 VITAMIN B12 DEFICIENCY (NON ANEMIC): Primary | ICD-10-CM

## 2025-08-06 PROCEDURE — 96372 THER/PROPH/DIAG INJ SC/IM: CPT | Performed by: PHYSICIAN ASSISTANT

## 2025-08-06 PROCEDURE — 99207 PR NO CHARGE NURSE ONLY: CPT

## 2025-08-06 RX ADMIN — CYANOCOBALAMIN 1000 MCG: 1000 INJECTION, SOLUTION INTRAMUSCULAR; SUBCUTANEOUS at 11:25

## 2025-08-09 ENCOUNTER — LAB (OUTPATIENT)
Dept: LAB | Facility: CLINIC | Age: 55
End: 2025-08-09
Payer: COMMERCIAL

## 2025-08-09 DIAGNOSIS — R53.82 CHRONIC FATIGUE: ICD-10-CM

## 2025-08-09 DIAGNOSIS — D75.89 MACROCYTOSIS WITHOUT ANEMIA: ICD-10-CM

## 2025-08-09 LAB
ERYTHROCYTE [DISTWIDTH] IN BLOOD BY AUTOMATED COUNT: 12 % (ref 10–15)
HCT VFR BLD AUTO: 39.8 % (ref 35–47)
HGB BLD-MCNC: 12.8 G/DL (ref 11.7–15.7)
MCH RBC QN AUTO: 33.3 PG (ref 26.5–33)
MCHC RBC AUTO-ENTMCNC: 32.2 G/DL (ref 31.5–36.5)
MCV RBC AUTO: 104 FL (ref 78–100)
PLATELET # BLD AUTO: 365 10E3/UL (ref 150–450)
RBC # BLD AUTO: 3.84 10E6/UL (ref 3.8–5.2)
WBC # BLD AUTO: 7.8 10E3/UL (ref 4–11)

## 2025-08-09 PROCEDURE — 85027 COMPLETE CBC AUTOMATED: CPT

## 2025-08-09 PROCEDURE — 82941 ASSAY OF GASTRIN: CPT | Mod: 90

## 2025-08-09 PROCEDURE — 99000 SPECIMEN HANDLING OFFICE-LAB: CPT

## 2025-08-09 PROCEDURE — 82607 VITAMIN B-12: CPT

## 2025-08-09 PROCEDURE — 36415 COLL VENOUS BLD VENIPUNCTURE: CPT

## 2025-08-10 LAB — VIT B12 SERPL-MCNC: 1018 PG/ML (ref 232–1245)

## 2025-08-12 LAB — GASTRIN SERPL-MCNC: 34 PG/ML

## 2025-08-21 ENCOUNTER — TELEPHONE (OUTPATIENT)
Dept: FAMILY MEDICINE | Facility: CLINIC | Age: 55
End: 2025-08-21
Payer: COMMERCIAL

## 2025-08-21 DIAGNOSIS — E53.8 VITAMIN B12 DEFICIENCY (NON ANEMIC): Primary | ICD-10-CM

## 2025-08-21 RX ORDER — CYANOCOBALAMIN 1000 UG/ML
1000 INJECTION, SOLUTION INTRAMUSCULAR; SUBCUTANEOUS WEEKLY
Status: ACTIVE | OUTPATIENT
Start: 2025-08-21 | End: 2025-11-13

## 2025-08-27 ENCOUNTER — TELEPHONE (OUTPATIENT)
Dept: OPHTHALMOLOGY | Facility: CLINIC | Age: 55
End: 2025-08-27
Payer: COMMERCIAL

## 2025-08-28 ENCOUNTER — TELEPHONE (OUTPATIENT)
Dept: OPHTHALMOLOGY | Facility: CLINIC | Age: 55
End: 2025-08-28